# Patient Record
Sex: FEMALE | Race: WHITE | Employment: OTHER | URBAN - METROPOLITAN AREA
[De-identification: names, ages, dates, MRNs, and addresses within clinical notes are randomized per-mention and may not be internally consistent; named-entity substitution may affect disease eponyms.]

---

## 2017-06-27 ENCOUNTER — OFFICE VISIT (OUTPATIENT)
Dept: ENT CLINIC | Age: 77
End: 2017-06-27

## 2017-06-27 VITALS
HEART RATE: 71 BPM | SYSTOLIC BLOOD PRESSURE: 127 MMHG | HEIGHT: 63 IN | DIASTOLIC BLOOD PRESSURE: 77 MMHG | BODY MASS INDEX: 25.87 KG/M2 | WEIGHT: 146 LBS

## 2017-06-27 DIAGNOSIS — C30.0: Primary | ICD-10-CM

## 2017-06-27 DIAGNOSIS — H61.23 BILATERAL IMPACTED CERUMEN: ICD-10-CM

## 2017-06-27 PROCEDURE — G8399 PT W/DXA RESULTS DOCUMENT: HCPCS | Performed by: OTOLARYNGOLOGY

## 2017-06-27 PROCEDURE — 99213 OFFICE O/P EST LOW 20 MIN: CPT | Performed by: OTOLARYNGOLOGY

## 2017-06-27 PROCEDURE — 1123F ACP DISCUSS/DSCN MKR DOCD: CPT | Performed by: OTOLARYNGOLOGY

## 2017-06-27 PROCEDURE — 1090F PRES/ABSN URINE INCON ASSESS: CPT | Performed by: OTOLARYNGOLOGY

## 2017-06-27 PROCEDURE — 69210 REMOVE IMPACTED EAR WAX UNI: CPT | Performed by: OTOLARYNGOLOGY

## 2017-06-27 PROCEDURE — 1036F TOBACCO NON-USER: CPT | Performed by: OTOLARYNGOLOGY

## 2017-06-27 PROCEDURE — 4040F PNEUMOC VAC/ADMIN/RCVD: CPT | Performed by: OTOLARYNGOLOGY

## 2017-06-27 PROCEDURE — G8419 CALC BMI OUT NRM PARAM NOF/U: HCPCS | Performed by: OTOLARYNGOLOGY

## 2017-06-27 PROCEDURE — G8427 DOCREV CUR MEDS BY ELIG CLIN: HCPCS | Performed by: OTOLARYNGOLOGY

## 2018-03-26 ENCOUNTER — OFFICE VISIT (OUTPATIENT)
Dept: ENT CLINIC | Age: 78
End: 2018-03-26

## 2018-03-26 VITALS — SYSTOLIC BLOOD PRESSURE: 118 MMHG | DIASTOLIC BLOOD PRESSURE: 80 MMHG

## 2018-03-26 DIAGNOSIS — C30.0: Primary | ICD-10-CM

## 2018-03-26 PROCEDURE — 31231 NASAL ENDOSCOPY DX: CPT | Performed by: OTOLARYNGOLOGY

## 2018-03-26 PROCEDURE — 4040F PNEUMOC VAC/ADMIN/RCVD: CPT | Performed by: OTOLARYNGOLOGY

## 2018-03-26 PROCEDURE — 1123F ACP DISCUSS/DSCN MKR DOCD: CPT | Performed by: OTOLARYNGOLOGY

## 2018-03-26 PROCEDURE — G8427 DOCREV CUR MEDS BY ELIG CLIN: HCPCS | Performed by: OTOLARYNGOLOGY

## 2018-03-26 PROCEDURE — G8421 BMI NOT CALCULATED: HCPCS | Performed by: OTOLARYNGOLOGY

## 2018-03-26 PROCEDURE — 1090F PRES/ABSN URINE INCON ASSESS: CPT | Performed by: OTOLARYNGOLOGY

## 2018-03-26 PROCEDURE — G8399 PT W/DXA RESULTS DOCUMENT: HCPCS | Performed by: OTOLARYNGOLOGY

## 2018-03-26 PROCEDURE — G8484 FLU IMMUNIZE NO ADMIN: HCPCS | Performed by: OTOLARYNGOLOGY

## 2018-03-26 PROCEDURE — 1036F TOBACCO NON-USER: CPT | Performed by: OTOLARYNGOLOGY

## 2018-04-02 ENCOUNTER — HOSPITAL ENCOUNTER (OUTPATIENT)
Dept: CT IMAGING | Age: 78
Discharge: HOME | End: 2018-04-02
Attending: OTOLARYNGOLOGY | Admitting: OTOLARYNGOLOGY

## 2018-04-02 DIAGNOSIS — C30.0: ICD-10-CM

## 2018-04-02 DIAGNOSIS — C30.0 MALIGNANT NEOPLASM OF NASAL CAVITY (HCC): ICD-10-CM

## 2019-05-16 ENCOUNTER — OFFICE VISIT (OUTPATIENT)
Dept: ENT CLINIC | Age: 79
End: 2019-05-16
Payer: MEDICARE

## 2019-05-16 VITALS — HEART RATE: 64 BPM | SYSTOLIC BLOOD PRESSURE: 118 MMHG | OXYGEN SATURATION: 98 % | DIASTOLIC BLOOD PRESSURE: 60 MMHG

## 2019-05-16 DIAGNOSIS — N28.9 KIDNEY DISEASE: ICD-10-CM

## 2019-05-16 DIAGNOSIS — G44.001 INTRACTABLE CLUSTER HEADACHE SYNDROME, UNSPECIFIED CHRONICITY PATTERN: ICD-10-CM

## 2019-05-16 DIAGNOSIS — C30.0: Primary | ICD-10-CM

## 2019-05-16 PROCEDURE — 1123F ACP DISCUSS/DSCN MKR DOCD: CPT | Performed by: OTOLARYNGOLOGY

## 2019-05-16 PROCEDURE — 1036F TOBACCO NON-USER: CPT | Performed by: OTOLARYNGOLOGY

## 2019-05-16 PROCEDURE — 99213 OFFICE O/P EST LOW 20 MIN: CPT | Performed by: OTOLARYNGOLOGY

## 2019-05-16 PROCEDURE — G8421 BMI NOT CALCULATED: HCPCS | Performed by: OTOLARYNGOLOGY

## 2019-05-16 PROCEDURE — G8399 PT W/DXA RESULTS DOCUMENT: HCPCS | Performed by: OTOLARYNGOLOGY

## 2019-05-16 PROCEDURE — 4040F PNEUMOC VAC/ADMIN/RCVD: CPT | Performed by: OTOLARYNGOLOGY

## 2019-05-16 PROCEDURE — 1090F PRES/ABSN URINE INCON ASSESS: CPT | Performed by: OTOLARYNGOLOGY

## 2019-05-16 PROCEDURE — G8427 DOCREV CUR MEDS BY ELIG CLIN: HCPCS | Performed by: OTOLARYNGOLOGY

## 2019-05-16 RX ORDER — PREDNISONE 10 MG/1
TABLET ORAL
Qty: 25 TABLET | Refills: 0 | Status: SHIPPED | OUTPATIENT
Start: 2019-05-16 | End: 2019-06-04 | Stop reason: SDUPTHER

## 2019-05-16 NOTE — PROGRESS NOTES
Patient relates that a couple weeks ago she had an apparent sinus infection saw her PCP who treated her with amoxicillin as well as the nose spray and oral medication. He did have a CT scan done which did not show evidence of recurrence of her nasal malignancy but did show some evidence of sinus infection. She apparently felt somewhat better but is now still noticing severe pain in the right frontal area which appears to be persistent. There is no further congestion and drainage through her nose with bleeding as she had in the initial phase. More recently, she's been experiencing diplopia which is making it impossible for her to drive. She did see an ophthalmologist who tried laser therapy which apparently has not been particularly successful. No particular change in vision otherwise has been noted. There's been no fever. She does not smoke. Currently, she does not appear to be in any acute distress. Ear examination reveals normal findings with normal-appearing tympanic membranes and ear canals. Oral examination is unremarkable. The neck is free of any adenopathy, mass, thyroid enlargement. There is no obvious tenderness overlying the frontal sinuses on either side. I have evaluated her extraocular movements which seemed to be intact. No pupils are equal round regular and reactive to light and accommodation. Nasal mucosa treated with cotton pledgets  impregnated with Afrin and lidocaine placed in middle meatuses against turbinates. Pledgets left in place for five minutes and removed enabling enhanced visualization. The exam revealed positive edema of mucosa. it was negative for erythema indicative of infection. There was not evidence of deviation of the septum which was not significant. There were not polyps present. .There were not other masses present. The turbinates were not enlarged beyond normal.  I cannot detect any evidence of recurrent malignancy. No obvious purulent drainage is noted.   I suspect that some of this may be more of an allergic phenomenon with inadequate drainage from the sinuses. I suggested that she continue saline irrigation and she is long-standing done. I do feel the dryness is part of the problem which relates back to her radiation therapy. In addition, we'll obtain an MRI scan. I will place her on prednisone. I have reviewed all of the findings including a recent CT scan.

## 2019-06-04 DIAGNOSIS — C30.0: ICD-10-CM

## 2019-06-04 DIAGNOSIS — G44.001 INTRACTABLE CLUSTER HEADACHE SYNDROME, UNSPECIFIED CHRONICITY PATTERN: ICD-10-CM

## 2019-06-04 RX ORDER — PREDNISONE 10 MG/1
TABLET ORAL
Qty: 25 TABLET | Refills: 0 | Status: SHIPPED | OUTPATIENT
Start: 2019-06-04 | End: 2019-12-05

## 2019-06-19 ENCOUNTER — TELEPHONE (OUTPATIENT)
Dept: ENT CLINIC | Age: 79
End: 2019-06-19

## 2019-06-19 DIAGNOSIS — G44.001 INTRACTABLE CLUSTER HEADACHE SYNDROME, UNSPECIFIED CHRONICITY PATTERN: ICD-10-CM

## 2019-06-19 DIAGNOSIS — C30.0: Primary | ICD-10-CM

## 2019-06-27 ENCOUNTER — HOSPITAL ENCOUNTER (OUTPATIENT)
Dept: MRI IMAGING | Age: 79
Discharge: HOME | End: 2019-06-27
Payer: MEDICARE

## 2019-06-27 DIAGNOSIS — G44.001 INTRACTABLE CLUSTER HEADACHE SYNDROME, UNSPECIFIED CHRONICITY PATTERN: ICD-10-CM

## 2019-06-27 DIAGNOSIS — C30.0: ICD-10-CM

## 2019-06-27 LAB
ALBUMIN SERPL-MCNC: 4.2 G/DL (ref 3.4–5)
ANION GAP SERPL CALCULATED.3IONS-SCNC: 9 MMOL/L (ref 3–16)
BUN BLDV-MCNC: 16 MG/DL (ref 7–20)
CALCIUM SERPL-MCNC: 9.4 MG/DL (ref 8.3–10.6)
CHLORIDE BLD-SCNC: 106 MMOL/L (ref 99–110)
CO2: 27 MMOL/L (ref 21–32)
CREAT SERPL-MCNC: 1.2 MG/DL (ref 0.6–1.2)
GFR AFRICAN AMERICAN: 53
GFR NON-AFRICAN AMERICAN: 43
GLUCOSE BLD-MCNC: 93 MG/DL (ref 70–99)
PHOSPHORUS: 3.4 MG/DL (ref 2.5–4.9)
POTASSIUM SERPL-SCNC: 4.4 MMOL/L (ref 3.5–5.1)
SODIUM BLD-SCNC: 142 MMOL/L (ref 136–145)

## 2019-06-27 PROCEDURE — 80069 RENAL FUNCTION PANEL: CPT

## 2019-06-27 PROCEDURE — 2580000003 HC RX 258: Performed by: OTOLARYNGOLOGY

## 2019-06-27 PROCEDURE — 6360000004 HC RX CONTRAST MEDICATION: Performed by: OTOLARYNGOLOGY

## 2019-06-27 PROCEDURE — A9579 GAD-BASE MR CONTRAST NOS,1ML: HCPCS | Performed by: OTOLARYNGOLOGY

## 2019-06-27 PROCEDURE — 36415 COLL VENOUS BLD VENIPUNCTURE: CPT

## 2019-06-27 PROCEDURE — 70553 MRI BRAIN STEM W/O & W/DYE: CPT

## 2019-06-27 RX ORDER — SODIUM CHLORIDE 0.9 % (FLUSH) 0.9 %
10 SYRINGE (ML) INJECTION ONCE
Status: COMPLETED | OUTPATIENT
Start: 2019-06-27 | End: 2019-06-27

## 2019-06-27 RX ADMIN — GADOTERIDOL 13 ML: 279.3 INJECTION, SOLUTION INTRAVENOUS at 15:30

## 2019-06-27 RX ADMIN — Medication 10 ML: at 15:31

## 2019-12-02 ENCOUNTER — TELEPHONE (OUTPATIENT)
Dept: ENT CLINIC | Age: 79
End: 2019-12-02

## 2019-12-02 DIAGNOSIS — J02.9 ACUTE PHARYNGITIS, UNSPECIFIED ETIOLOGY: Primary | ICD-10-CM

## 2019-12-02 RX ORDER — AZITHROMYCIN 250 MG/1
TABLET, FILM COATED ORAL
Qty: 1 PACKET | Refills: 0 | Status: SHIPPED | OUTPATIENT
Start: 2019-12-02 | End: 2019-12-05

## 2019-12-05 ENCOUNTER — OFFICE VISIT (OUTPATIENT)
Dept: ENT CLINIC | Age: 79
End: 2019-12-05
Payer: MEDICARE

## 2019-12-05 VITALS — DIASTOLIC BLOOD PRESSURE: 66 MMHG | OXYGEN SATURATION: 94 % | HEART RATE: 81 BPM | SYSTOLIC BLOOD PRESSURE: 104 MMHG

## 2019-12-05 DIAGNOSIS — J04.10 TRACHEITIS: Primary | ICD-10-CM

## 2019-12-05 PROCEDURE — 96372 THER/PROPH/DIAG INJ SC/IM: CPT | Performed by: OTOLARYNGOLOGY

## 2019-12-05 PROCEDURE — 1036F TOBACCO NON-USER: CPT | Performed by: OTOLARYNGOLOGY

## 2019-12-05 PROCEDURE — G8421 BMI NOT CALCULATED: HCPCS | Performed by: OTOLARYNGOLOGY

## 2019-12-05 PROCEDURE — 1090F PRES/ABSN URINE INCON ASSESS: CPT | Performed by: OTOLARYNGOLOGY

## 2019-12-05 PROCEDURE — 99213 OFFICE O/P EST LOW 20 MIN: CPT | Performed by: OTOLARYNGOLOGY

## 2019-12-05 PROCEDURE — 4040F PNEUMOC VAC/ADMIN/RCVD: CPT | Performed by: OTOLARYNGOLOGY

## 2019-12-05 PROCEDURE — 1123F ACP DISCUSS/DSCN MKR DOCD: CPT | Performed by: OTOLARYNGOLOGY

## 2019-12-05 PROCEDURE — G8399 PT W/DXA RESULTS DOCUMENT: HCPCS | Performed by: OTOLARYNGOLOGY

## 2019-12-05 PROCEDURE — G8428 CUR MEDS NOT DOCUMENT: HCPCS | Performed by: OTOLARYNGOLOGY

## 2019-12-05 PROCEDURE — G8484 FLU IMMUNIZE NO ADMIN: HCPCS | Performed by: OTOLARYNGOLOGY

## 2019-12-05 RX ORDER — METHYLPREDNISOLONE ACETATE 40 MG/ML
40 INJECTION, SUSPENSION INTRA-ARTICULAR; INTRALESIONAL; INTRAMUSCULAR; SOFT TISSUE ONCE
Status: COMPLETED | OUTPATIENT
Start: 2019-12-05 | End: 2019-12-05

## 2019-12-05 RX ORDER — CLINDAMYCIN HYDROCHLORIDE 300 MG/1
300 CAPSULE ORAL 3 TIMES DAILY
Qty: 21 CAPSULE | Refills: 0 | Status: SHIPPED | OUTPATIENT
Start: 2019-12-05 | End: 2019-12-12

## 2019-12-05 RX ORDER — METHYLPREDNISOLONE 4 MG/1
4 TABLET ORAL SEE ADMIN INSTRUCTIONS
Qty: 1 KIT | Refills: 0 | Status: SHIPPED | OUTPATIENT
Start: 2019-12-05 | End: 2021-03-09

## 2019-12-05 RX ADMIN — METHYLPREDNISOLONE ACETATE 40 MG: 40 INJECTION, SUSPENSION INTRA-ARTICULAR; INTRALESIONAL; INTRAMUSCULAR; SOFT TISSUE at 10:48

## 2021-03-09 ENCOUNTER — OFFICE VISIT (OUTPATIENT)
Dept: ENT CLINIC | Age: 81
End: 2021-03-09
Payer: MEDICARE

## 2021-03-09 VITALS — SYSTOLIC BLOOD PRESSURE: 123 MMHG | TEMPERATURE: 97.1 F | HEART RATE: 73 BPM | DIASTOLIC BLOOD PRESSURE: 79 MMHG

## 2021-03-09 DIAGNOSIS — Z85.22 HISTORY OF CANCER OF NASAL CAVITY: Primary | ICD-10-CM

## 2021-03-09 PROCEDURE — G8427 DOCREV CUR MEDS BY ELIG CLIN: HCPCS | Performed by: OTOLARYNGOLOGY

## 2021-03-09 PROCEDURE — G8399 PT W/DXA RESULTS DOCUMENT: HCPCS | Performed by: OTOLARYNGOLOGY

## 2021-03-09 PROCEDURE — 99213 OFFICE O/P EST LOW 20 MIN: CPT | Performed by: OTOLARYNGOLOGY

## 2021-03-09 PROCEDURE — 1036F TOBACCO NON-USER: CPT | Performed by: OTOLARYNGOLOGY

## 2021-03-09 PROCEDURE — G8421 BMI NOT CALCULATED: HCPCS | Performed by: OTOLARYNGOLOGY

## 2021-03-09 PROCEDURE — G8484 FLU IMMUNIZE NO ADMIN: HCPCS | Performed by: OTOLARYNGOLOGY

## 2021-03-09 PROCEDURE — 4040F PNEUMOC VAC/ADMIN/RCVD: CPT | Performed by: OTOLARYNGOLOGY

## 2021-03-09 PROCEDURE — 1090F PRES/ABSN URINE INCON ASSESS: CPT | Performed by: OTOLARYNGOLOGY

## 2021-03-09 PROCEDURE — 1123F ACP DISCUSS/DSCN MKR DOCD: CPT | Performed by: OTOLARYNGOLOGY

## 2021-03-09 NOTE — PROGRESS NOTES
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 He has had no particular symptoms other than dryness which is common for her. No bleeding is been noted and no difficulty in breathing has been apparent. She has had no fever. Currently, she appears in no acute distress. Ear examination does reveal cerumen removed with curettes. The tympanic membranes appear normal and the remainder of the ear examination is unremarkable. Oral examination is unremarkable. The neck is free of adenopathy, mass, thyroid enlargement. Nasal mucosa treated with cotton pledgets  impregnated with Afrin and lidocaine placed in middle meatuses against turbinates. Pledgets left in place for five minutes and removed enabling enhanced visualization. The exam revealed positive edema of mucosa. it was negative for erythema indicative of infection. There was not evidence of deviation of the septum which was not significant. There were not polyps present. .There were not other masses present. The turbinates were not enlarged beyond normal.  Other than dryness of the mucous membranes, the nose appears entirely normal with no evidence of recurrent disease. No infection is apparent. She will return in 6 to 12 months.

## 2021-04-09 ENCOUNTER — OFFICE VISIT (OUTPATIENT)
Dept: ENT CLINIC | Age: 81
End: 2021-04-09
Payer: MEDICARE

## 2021-04-09 VITALS
WEIGHT: 146.2 LBS | BODY MASS INDEX: 25.9 KG/M2 | SYSTOLIC BLOOD PRESSURE: 125 MMHG | TEMPERATURE: 97.2 F | DIASTOLIC BLOOD PRESSURE: 81 MMHG | HEART RATE: 77 BPM

## 2021-04-09 DIAGNOSIS — K21.9 LARYNGOPHARYNGEAL REFLUX DISEASE: ICD-10-CM

## 2021-04-09 DIAGNOSIS — J30.9 ALLERGIC SINUSITIS: ICD-10-CM

## 2021-04-09 DIAGNOSIS — J32.9 RECURRENT SINUSITIS: Primary | ICD-10-CM

## 2021-04-09 PROCEDURE — G8399 PT W/DXA RESULTS DOCUMENT: HCPCS | Performed by: OTOLARYNGOLOGY

## 2021-04-09 PROCEDURE — 1123F ACP DISCUSS/DSCN MKR DOCD: CPT | Performed by: OTOLARYNGOLOGY

## 2021-04-09 PROCEDURE — 1090F PRES/ABSN URINE INCON ASSESS: CPT | Performed by: OTOLARYNGOLOGY

## 2021-04-09 PROCEDURE — 4040F PNEUMOC VAC/ADMIN/RCVD: CPT | Performed by: OTOLARYNGOLOGY

## 2021-04-09 PROCEDURE — G8419 CALC BMI OUT NRM PARAM NOF/U: HCPCS | Performed by: OTOLARYNGOLOGY

## 2021-04-09 PROCEDURE — 99213 OFFICE O/P EST LOW 20 MIN: CPT | Performed by: OTOLARYNGOLOGY

## 2021-04-09 PROCEDURE — G8427 DOCREV CUR MEDS BY ELIG CLIN: HCPCS | Performed by: OTOLARYNGOLOGY

## 2021-04-09 PROCEDURE — 1036F TOBACCO NON-USER: CPT | Performed by: OTOLARYNGOLOGY

## 2021-04-09 RX ORDER — GUAIFENESIN 600 MG/1
600 TABLET, EXTENDED RELEASE ORAL 2 TIMES DAILY
Qty: 30 TABLET | Refills: 0 | Status: SHIPPED | OUTPATIENT
Start: 2021-04-09 | End: 2021-04-24

## 2021-04-09 RX ORDER — DOXYCYCLINE HYCLATE 100 MG
100 TABLET ORAL 2 TIMES DAILY
Qty: 28 TABLET | Refills: 0 | Status: SHIPPED | OUTPATIENT
Start: 2021-04-09 | End: 2021-04-23

## 2021-04-09 RX ORDER — METHYLPREDNISOLONE 4 MG/1
4 TABLET ORAL SEE ADMIN INSTRUCTIONS
Qty: 1 KIT | Refills: 0 | Status: SHIPPED | OUTPATIENT
Start: 2021-04-09 | End: 2022-03-03 | Stop reason: ALTCHOICE

## 2021-04-09 NOTE — PROGRESS NOTES
The course the past 1 month, the patient has been noticing a marked increase in drainage down the back of her throat causing it to be quite sore. She has had no trouble swallowing and has had no particular voice change. She has had no fever. She tells me that she has been having diplopia for the past several months that began suddenly and has been evaluated and being evaluated with the [address] without resolution at this point. She is not driving. She has had no significant nasal symptoms directly. Therefore, I do not feel that this is related to her previous problem. Currently, she is in no obvious acute distress. Ear examination reveals normal-appearing tympanic membranes and ear canals bilaterally. Oral examination is unremarkable. Indirect laryngoscopy reveals a normal-appearing epiglottis. The cords are well visualized and are mobile and have no lesions. No abnormalities are significantly present. She does have a history of reflux and does take Prilosec on a daily basis. Nasal mucosa treated with cotton pledgets  impregnated with Afrin and lidocaine placed in middle meatuses against turbinates. Pledgets left in place for five minutes and removed enabling enhanced visualization. The exam revealed positive edema of mucosa. it was positive for erythema indicative of infection. There was not evidence of deviation of the septum which was not significant. There were not polyps present. .There were not other masses present. The turbinates were not enlarged beyond normal.  Extraocular movements are intact and full. Pupils are equal round regular and reactive to light and accommodation. Findings seem mostly those of sinus drainage likely on an allergic as well as some mild infectious basis. There is no evidence of recurrent disease in her nose as far as the malignancy. The reflux may also be playing a role and therefore she should continue the Prilosec.   We will add a course of doxycycline as well as a Medrol Dosepak and Mucinex. She will contact me in 1 week to determine her response.

## 2021-05-19 ENCOUNTER — OFFICE VISIT (OUTPATIENT)
Dept: ENT CLINIC | Age: 81
End: 2021-05-19
Payer: MEDICARE

## 2021-05-19 VITALS — HEART RATE: 72 BPM | DIASTOLIC BLOOD PRESSURE: 77 MMHG | TEMPERATURE: 97.1 F | SYSTOLIC BLOOD PRESSURE: 110 MMHG

## 2021-05-19 DIAGNOSIS — R13.19 ESOPHAGEAL DYSPHAGIA: Primary | ICD-10-CM

## 2021-05-19 PROCEDURE — G8419 CALC BMI OUT NRM PARAM NOF/U: HCPCS | Performed by: OTOLARYNGOLOGY

## 2021-05-19 PROCEDURE — 99213 OFFICE O/P EST LOW 20 MIN: CPT | Performed by: OTOLARYNGOLOGY

## 2021-05-19 PROCEDURE — 1036F TOBACCO NON-USER: CPT | Performed by: OTOLARYNGOLOGY

## 2021-05-19 PROCEDURE — 1090F PRES/ABSN URINE INCON ASSESS: CPT | Performed by: OTOLARYNGOLOGY

## 2021-05-19 PROCEDURE — G8399 PT W/DXA RESULTS DOCUMENT: HCPCS | Performed by: OTOLARYNGOLOGY

## 2021-05-19 PROCEDURE — 4040F PNEUMOC VAC/ADMIN/RCVD: CPT | Performed by: OTOLARYNGOLOGY

## 2021-05-19 PROCEDURE — 1123F ACP DISCUSS/DSCN MKR DOCD: CPT | Performed by: OTOLARYNGOLOGY

## 2021-05-19 PROCEDURE — G8427 DOCREV CUR MEDS BY ELIG CLIN: HCPCS | Performed by: OTOLARYNGOLOGY

## 2021-05-19 RX ORDER — CLOTRIMAZOLE 10 MG/1
10 LOZENGE ORAL; TOPICAL 3 TIMES DAILY
Qty: 30 TABLET | Refills: 0 | Status: SHIPPED | OUTPATIENT
Start: 2021-05-19 | End: 2021-05-29

## 2021-05-19 RX ORDER — FAMOTIDINE 40 MG/1
40 TABLET, FILM COATED ORAL EVERY EVENING
Qty: 30 TABLET | Refills: 0 | Status: SHIPPED | OUTPATIENT
Start: 2021-05-19 | End: 2021-06-11

## 2021-06-11 DIAGNOSIS — R13.19 ESOPHAGEAL DYSPHAGIA: ICD-10-CM

## 2021-06-11 RX ORDER — FAMOTIDINE 40 MG/1
40 TABLET, FILM COATED ORAL EVERY EVENING
Qty: 30 TABLET | Refills: 0 | Status: SHIPPED | OUTPATIENT
Start: 2021-06-11 | End: 2021-07-21

## 2021-06-12 ENCOUNTER — HOSPITAL ENCOUNTER (OUTPATIENT)
Dept: GENERAL RADIOLOGY | Age: 81
Discharge: HOME | End: 2021-06-12
Payer: MEDICARE

## 2021-06-12 DIAGNOSIS — R13.10 DYSPHAGIA, UNSPECIFIED TYPE: ICD-10-CM

## 2021-06-12 PROCEDURE — 74220 X-RAY XM ESOPHAGUS 1CNTRST: CPT

## 2021-06-14 ENCOUNTER — TELEPHONE (OUTPATIENT)
Dept: OTOLARYNGOLOGY | Age: 81
End: 2021-06-14

## 2021-06-30 ENCOUNTER — TELEPHONE (OUTPATIENT)
Dept: OTOLARYNGOLOGY | Age: 81
End: 2021-06-30

## 2021-06-30 NOTE — TELEPHONE ENCOUNTER
I have spoken with the patient in the regards that the barium swallow was unremarkable but that she does have some problem actually swallowing the pill. There is no evidence on this examination of any tumor. Previous examinations have not indicated any tumor. 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 She is now related that she is having some blood in her sputum which is not generally felt to be due to a cough. She has been seen and evaluated for double vision and recent MRI scan has been done. When that is evaluated, she may well need further evaluation in her throat with perhaps a fiberoptic scoping for the upper digestive tract and perhaps an evaluation by a gastroenterologist for the lower. I originally thought that she might need a modified barium swallow for speech pathology to do dysphagia care. However, further evaluation may be necessary prior to that. This may also include the CT scan of the neck. In any event, she will be contacting the office following her MRI scan.

## 2021-07-16 DIAGNOSIS — R13.19 ESOPHAGEAL DYSPHAGIA: ICD-10-CM

## 2021-07-21 RX ORDER — FAMOTIDINE 40 MG/1
40 TABLET, FILM COATED ORAL EVERY EVENING
Qty: 30 TABLET | Refills: 0 | Status: SHIPPED | OUTPATIENT
Start: 2021-07-21 | End: 2022-03-03 | Stop reason: ALTCHOICE

## 2021-08-15 DIAGNOSIS — R13.19 ESOPHAGEAL DYSPHAGIA: ICD-10-CM

## 2021-08-16 RX ORDER — FAMOTIDINE 40 MG/1
TABLET, FILM COATED ORAL
Qty: 30 TABLET | Refills: 0 | OUTPATIENT
Start: 2021-08-16

## 2022-02-14 ENCOUNTER — OFFICE VISIT (OUTPATIENT)
Dept: ENT CLINIC | Age: 82
End: 2022-02-14
Payer: MEDICARE

## 2022-02-14 VITALS — TEMPERATURE: 97.1 F | HEART RATE: 76 BPM | SYSTOLIC BLOOD PRESSURE: 132 MMHG | DIASTOLIC BLOOD PRESSURE: 83 MMHG

## 2022-02-14 DIAGNOSIS — Z85.22 HISTORY OF CANCER OF NASAL CAVITY: Primary | ICD-10-CM

## 2022-02-14 DIAGNOSIS — J34.89 NASAL SEPTAL PERFORATION: ICD-10-CM

## 2022-02-14 DIAGNOSIS — M26.621 ARTHRALGIA OF RIGHT TEMPOROMANDIBULAR JOINT: ICD-10-CM

## 2022-02-14 PROCEDURE — G8419 CALC BMI OUT NRM PARAM NOF/U: HCPCS | Performed by: STUDENT IN AN ORGANIZED HEALTH CARE EDUCATION/TRAINING PROGRAM

## 2022-02-14 PROCEDURE — G8427 DOCREV CUR MEDS BY ELIG CLIN: HCPCS | Performed by: STUDENT IN AN ORGANIZED HEALTH CARE EDUCATION/TRAINING PROGRAM

## 2022-02-14 PROCEDURE — 31231 NASAL ENDOSCOPY DX: CPT | Performed by: STUDENT IN AN ORGANIZED HEALTH CARE EDUCATION/TRAINING PROGRAM

## 2022-02-14 PROCEDURE — 1036F TOBACCO NON-USER: CPT | Performed by: STUDENT IN AN ORGANIZED HEALTH CARE EDUCATION/TRAINING PROGRAM

## 2022-02-14 PROCEDURE — G8399 PT W/DXA RESULTS DOCUMENT: HCPCS | Performed by: STUDENT IN AN ORGANIZED HEALTH CARE EDUCATION/TRAINING PROGRAM

## 2022-02-14 PROCEDURE — 1123F ACP DISCUSS/DSCN MKR DOCD: CPT | Performed by: STUDENT IN AN ORGANIZED HEALTH CARE EDUCATION/TRAINING PROGRAM

## 2022-02-14 PROCEDURE — 99213 OFFICE O/P EST LOW 20 MIN: CPT | Performed by: STUDENT IN AN ORGANIZED HEALTH CARE EDUCATION/TRAINING PROGRAM

## 2022-02-14 PROCEDURE — 1090F PRES/ABSN URINE INCON ASSESS: CPT | Performed by: STUDENT IN AN ORGANIZED HEALTH CARE EDUCATION/TRAINING PROGRAM

## 2022-02-14 PROCEDURE — G8484 FLU IMMUNIZE NO ADMIN: HCPCS | Performed by: STUDENT IN AN ORGANIZED HEALTH CARE EDUCATION/TRAINING PROGRAM

## 2022-02-14 PROCEDURE — 4040F PNEUMOC VAC/ADMIN/RCVD: CPT | Performed by: STUDENT IN AN ORGANIZED HEALTH CARE EDUCATION/TRAINING PROGRAM

## 2022-02-14 NOTE — PROGRESS NOTES
[address] VISIT      Patient Name: Kade Jackson Record Number:  [id number]  Primary Care Physician:  James Claire    ChiefComplaint     Chief Complaint   Patient presents with    Other     patient states she feels she has a sinus infection, sinus pressure and congestion. History of Present Illness     Kathleen Conner is an 81 y.o. 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 Last surgery was at [address] [place] [address] in [facility] approx 10 years ago     Interval History:   When she called to make the appointment approximately 6 weeks ago she was having mucopurulent drainage and a sinus infection. She was taking Tylenol as needed. Currently her sinus infection has resolved. She needs to establish with ENT now that Dr. Carmenza Dyer has retired. Denies epistaxis. Has been having some right-sided jaw pain, worse when opening and closing her jaw.     Past Medical History     Past Medical History:   Diagnosis Date    Cancer ([address]     sinuses and cheek right    Hyperlipidemia     Hypothyroidism     Nausea & vomiting        Past Surgical History     Past Surgical History:   Procedure Laterality Date    CHOLECYSTECTOMY      HYSTERECTOMY      OTHER SURGICAL HISTORY      chemo/radiation for sinus cancers    SINUS SURGERY      UPPER GASTROINTESTINAL ENDOSCOPY  7-17-13    with biopsy, dilatation, and endoscopic ultrasound       Family History     Family History   Problem Relation Age of Onset    Cancer Mother     Cancer Father     Cancer Brother        Social History     Social History     Tobacco Use    Smoking status: Never Smoker    Smokeless tobacco: Never Used   Substance Use Topics    Alcohol use: No     Comment: rare    Drug use: Not on file        Allergies     Allergies   Allergen Reactions    Naprosyn [Naproxen]     Macrobid [Nitrofurantoin Monohydrate Macrocrystals] Rash       Medications     Current Outpatient Medications   Medication Sig Dispense Refill    famotidine (PEPCID) 40 MG tablet TAKE 1 TABLET BY MOUTH EVERY EVENING 30 tablet 0    methylPREDNISolone (MEDROL, SAROJ,) 4 MG tablet Take 1 tablet by mouth See Admin Instructions Take by mouth. 1 kit 0    pravastatin (PRAVACHOL) 40 MG tablet TAKE 1 TABLET BY MOUTH NIGHTLY. 90 tablet 0    levothyroxine (LEVOTHROID) 75 MCG tablet Take 1 tablet by mouth daily 90 tablet 3    azelastine (ASTELIN) 0.1 % nasal spray 2 sprays by Nasal route 2 times daily Use in each nostril as directed 1 Bottle 3    fluticasone (FLONASE) 50 MCG/ACT nasal spray 2 sprays by Nasal route daily 1 Bottle 1    Biotin 1000 MCG TABS Take  by mouth.  therapeutic multivitamin-minerals (THERAGRAN-M) tablet Take 1 tablet by mouth daily.  calcium carbonate (OSCAL) 500 MG TABS tablet Take 500 mg by mouth daily.  aspirin 81 MG EC tablet Take 81 mg by mouth daily. No current facility-administered medications for this visit.        Review of Systems     REVIEW OF SYSTEMS  The following systems were reviewed and revealed the following in addition to any already discussed in the HPI:    CONSTITUTIONAL: no weight loss, no fever, no night sweats, no chills  EYES: no vision changes, no blurry vision  EARS: no hearing loss, no otalgia  NOSE: no epistaxis, no rhinorrhea  THROAT: No voice changes, no sore throat, no dysphagia    PhysicalExam     Vitals:    02/14/22 1454   BP: 132/83   Site: Left Upper Arm   Position: Sitting   Cuff Size: Medium Adult   Pulse: 76   Temp: 97.1 °F (36.2 °C)   TempSrc: Temporal       PHYSICAL EXAM  /83 (Site: Left Upper Arm, Position: Sitting, Cuff Size: Medium Adult)   Pulse 76   Temp 97.1 °F (36.2 °C) (Temporal)     GENERAL: No acute distress, alert and oriented, no hoarseness  EYES: EOMI, Anti-icteric  NOSE: On anterior rhinoscopy there is no epistaxis, nasal mucosa moist and normal appearing, no purulent drainage. EARS: Normal external appearance; on portable otomicroscopy:     -Ad: External auditory canal without stenosis, tympanic membrane clear, no middle ear effusions or retractions     -As: External auditory canal without stenosis, tympanic membrane clear, no middle ear effusions or retractions  FACE: HB 1/6 bilaterally, symmetric appearing, sensation equal bilaterally  ORAL CAVITY: No masses or lesions visualized or palpated, uvula is midline, moist mucous membranes  NECK: Tenderness palpation of right TMJ upon opening closing jaw. Normal range of motion, no thyromegaly, trachea is midline, no palpable lymphadenopathy or neck masses, no crepitus  CHEST: Normal respiratory effort, breathing comfortably, no retractions  SKIN: No rashes, normal appearing skin, no evidence of skin lesions/tumors  NEURO: Cranial Nerves 2, 3, 4, 5, 6, 7, 11, 12 intact bilaterally     I have performed a head and neck physical exam personally or was physically present during the key or critical portions of the service. Procedure     Nasal Endoscopy, Diagnostic ([address])    Pre-op: History of cancer of nasal cavity  Post-op: History of cancer of nasal cavity, nasal septal perforation  Procedure: Nasal endoscopy    After obtaining verbal consent from the patient 2% lidocaine with afrin was sprayed into the nasal cavities. After allowing a time for anesthesia, a 0- and 30-degree rigid nasal endoscope was used to examine the nasal septum, turbinates, and mucosal tissue. The middle meatus and sphenoethmoid recess was examined bilaterally. There were no complications. Pertinent findings include: No evidence of mass lesion within bilateral sinonasal cavities. Evidence of prior endoscopic sinus surgery with some crusting in the bilateral nasal passages that was debrided with #9 [place] suction.   There is a posterior small approximately pea-sized septal perforation. No mucopurulent drainage. *The patient tolerated the procedure well without complication. I attest that I was present for and did the entire procedure myself. Assessment and Plan     1. History of cancer of nasal cavity  -No evidence of disease on endoscopy  -Start sinonasal saline irrigations    2. Arthralgia of right temporomandibular joint  - NSAIDs as needed for inflammation and pain  - Conservative measure discussed including no gum chewing, eating a softer diet, cutting bigger and tougher bites into smaller pieces, warm compresses to the affected side  - Consider use of mouthguard to prevent nocturnal bruxism. A dentist may be able to provide the patient with a custom-made mouthguard or they can be obtained over-the-counter. 3. Nasal septal perforation  -Asymptomatic, likely iatrogenic from prior surgery. Follow-Up     Return if symptoms worsen or fail to improve. Dr. Gordon Alcantara [place] [address]  Department of Otolaryngology/Head and Neck Surgery  2/18/22    Medical Decision Making: The following items were considered in medical decision making:  Independent review of images  Review / order clinical lab tests  Review / order radiology tests  Decision to obtain old records      This note was generated completely or in part utilizing Dragon dictation speech recognition software. Occasionally, words are mistranscribed and despite editing, the text may contain inaccuracies due to incorrect word recognition. If further clarification is needed please contact the office at [phone].

## 2022-03-03 ENCOUNTER — OFFICE VISIT (OUTPATIENT)
Dept: ENT CLINIC | Age: 82
End: 2022-03-03
Payer: MEDICARE

## 2022-03-03 ENCOUNTER — TELEPHONE (OUTPATIENT)
Dept: ENT CLINIC | Age: 82
End: 2022-03-03

## 2022-03-03 ENCOUNTER — HOSPITAL ENCOUNTER (INPATIENT)
Age: 82
LOS: 2 days | Discharge: HOME | DRG: 151 | End: 2022-03-05
Attending: EMERGENCY MEDICINE | Admitting: INTERNAL MEDICINE
Payer: MEDICARE

## 2022-03-03 ENCOUNTER — APPOINTMENT (OUTPATIENT)
Dept: CT IMAGING | Age: 82
DRG: 151 | End: 2022-03-03
Payer: MEDICARE

## 2022-03-03 VITALS — HEART RATE: 62 BPM | TEMPERATURE: 97.1 F | DIASTOLIC BLOOD PRESSURE: 78 MMHG | SYSTOLIC BLOOD PRESSURE: 123 MMHG

## 2022-03-03 DIAGNOSIS — R04.0 ACUTE POSTERIOR EPISTAXIS: Primary | ICD-10-CM

## 2022-03-03 DIAGNOSIS — Z01.818 PRE-OP TESTING: Primary | ICD-10-CM

## 2022-03-03 DIAGNOSIS — Z85.22 HISTORY OF CANCER OF NASAL CAVITY: ICD-10-CM

## 2022-03-03 DIAGNOSIS — R04.0 EPISTAXIS: Primary | ICD-10-CM

## 2022-03-03 LAB
A/G RATIO: 1.9 (ref 1.1–2.2)
ALBUMIN SERPL-MCNC: 4.8 G/DL (ref 3.4–5)
ALP BLD-CCNC: 91 U/L (ref 40–129)
ALT SERPL-CCNC: 15 U/L (ref 10–40)
ANION GAP SERPL CALCULATED.3IONS-SCNC: 11 MMOL/L (ref 3–16)
APTT: 40.9 SEC (ref 26.2–38.6)
AST SERPL-CCNC: 26 U/L (ref 15–37)
BASOPHILS ABSOLUTE: 0.1 K/UL (ref 0–0.2)
BASOPHILS RELATIVE PERCENT: 0.8 %
BILIRUB SERPL-MCNC: 0.4 MG/DL (ref 0–1)
BUN BLDV-MCNC: 23 MG/DL (ref 7–20)
CALCIUM SERPL-MCNC: 9.6 MG/DL (ref 8.3–10.6)
CHLORIDE BLD-SCNC: 105 MMOL/L (ref 99–110)
CO2: 25 MMOL/L (ref 21–32)
CREAT SERPL-MCNC: 1 MG/DL (ref 0.6–1.2)
EOSINOPHILS ABSOLUTE: 0.2 K/UL (ref 0–0.6)
EOSINOPHILS RELATIVE PERCENT: 3.7 %
GFR AFRICAN AMERICAN: >60
GFR NON-AFRICAN AMERICAN: 53
GLUCOSE BLD-MCNC: 93 MG/DL (ref 70–99)
HCT VFR BLD CALC: 41.3 % (ref 36–48)
HEMOGLOBIN: 13.6 G/DL (ref 12–16)
INR BLD: 0.97 (ref 0.88–1.12)
LYMPHOCYTES ABSOLUTE: 1.5 K/UL (ref 1–5.1)
LYMPHOCYTES RELATIVE PERCENT: 22.3 %
MCH RBC QN AUTO: 29.6 PG (ref 26–34)
MCHC RBC AUTO-ENTMCNC: 33 G/DL (ref 31–36)
MCV RBC AUTO: 89.6 FL (ref 80–100)
MONOCYTES ABSOLUTE: 0.6 K/UL (ref 0–1.3)
MONOCYTES RELATIVE PERCENT: 9.4 %
NEUTROPHILS ABSOLUTE: 4.2 K/UL (ref 1.7–7.7)
NEUTROPHILS RELATIVE PERCENT: 63.8 %
PDW BLD-RTO: 14.1 % (ref 12.4–15.4)
PLATELET # BLD: 200 K/UL (ref 135–450)
PMV BLD AUTO: 8.7 FL (ref 5–10.5)
POTASSIUM REFLEX MAGNESIUM: 4.4 MMOL/L (ref 3.5–5.1)
PROTHROMBIN TIME: 11 SEC (ref 9.9–12.7)
RBC # BLD: 4.61 M/UL (ref 4–5.2)
SODIUM BLD-SCNC: 141 MMOL/L (ref 136–145)
TOTAL PROTEIN: 7.3 G/DL (ref 6.4–8.2)
WBC # BLD: 6.5 K/UL (ref 4–11)

## 2022-03-03 PROCEDURE — 1123F ACP DISCUSS/DSCN MKR DOCD: CPT | Performed by: STUDENT IN AN ORGANIZED HEALTH CARE EDUCATION/TRAINING PROGRAM

## 2022-03-03 PROCEDURE — 99214 OFFICE O/P EST MOD 30 MIN: CPT | Performed by: STUDENT IN AN ORGANIZED HEALTH CARE EDUCATION/TRAINING PROGRAM

## 2022-03-03 PROCEDURE — 6360000002 HC RX W HCPCS: Performed by: STUDENT IN AN ORGANIZED HEALTH CARE EDUCATION/TRAINING PROGRAM

## 2022-03-03 PROCEDURE — 85730 THROMBOPLASTIN TIME PARTIAL: CPT

## 2022-03-03 PROCEDURE — 85025 COMPLETE CBC W/AUTO DIFF WBC: CPT

## 2022-03-03 PROCEDURE — 1090F PRES/ABSN URINE INCON ASSESS: CPT | Performed by: STUDENT IN AN ORGANIZED HEALTH CARE EDUCATION/TRAINING PROGRAM

## 2022-03-03 PROCEDURE — 70486 CT MAXILLOFACIAL W/O DYE: CPT

## 2022-03-03 PROCEDURE — 85610 PROTHROMBIN TIME: CPT

## 2022-03-03 PROCEDURE — 2580000003 HC RX 258: Performed by: PHYSICIAN ASSISTANT

## 2022-03-03 PROCEDURE — 30905 CONTROL OF NOSEBLEED: CPT | Performed by: STUDENT IN AN ORGANIZED HEALTH CARE EDUCATION/TRAINING PROGRAM

## 2022-03-03 PROCEDURE — 94761 N-INVAS EAR/PLS OXIMETRY MLT: CPT

## 2022-03-03 PROCEDURE — 2580000003 HC RX 258: Performed by: INTERNAL MEDICINE

## 2022-03-03 PROCEDURE — 6370000000 HC RX 637 (ALT 250 FOR IP): Performed by: INTERNAL MEDICINE

## 2022-03-03 PROCEDURE — G8484 FLU IMMUNIZE NO ADMIN: HCPCS | Performed by: STUDENT IN AN ORGANIZED HEALTH CARE EDUCATION/TRAINING PROGRAM

## 2022-03-03 PROCEDURE — G8427 DOCREV CUR MEDS BY ELIG CLIN: HCPCS | Performed by: STUDENT IN AN ORGANIZED HEALTH CARE EDUCATION/TRAINING PROGRAM

## 2022-03-03 PROCEDURE — 093K8ZZ CONTROL BLEEDING IN NASAL MUCOSA AND SOFT TISSUE, VIA NATURAL OR ARTIFICIAL OPENING ENDOSCOPIC: ICD-10-PCS | Performed by: STUDENT IN AN ORGANIZED HEALTH CARE EDUCATION/TRAINING PROGRAM

## 2022-03-03 PROCEDURE — 36415 COLL VENOUS BLD VENIPUNCTURE: CPT

## 2022-03-03 PROCEDURE — 4040F PNEUMOC VAC/ADMIN/RCVD: CPT | Performed by: STUDENT IN AN ORGANIZED HEALTH CARE EDUCATION/TRAINING PROGRAM

## 2022-03-03 PROCEDURE — 80053 COMPREHEN METABOLIC PANEL: CPT

## 2022-03-03 PROCEDURE — G8420 CALC BMI NORM PARAMETERS: HCPCS | Performed by: STUDENT IN AN ORGANIZED HEALTH CARE EDUCATION/TRAINING PROGRAM

## 2022-03-03 PROCEDURE — 99284 EMERGENCY DEPT VISIT MOD MDM: CPT

## 2022-03-03 PROCEDURE — 1200000000 HC SEMI PRIVATE

## 2022-03-03 PROCEDURE — 09BR8ZX EXCISION OF LEFT MAXILLARY SINUS, VIA NATURAL OR ARTIFICIAL OPENING ENDOSCOPIC, DIAGNOSTIC: ICD-10-PCS | Performed by: STUDENT IN AN ORGANIZED HEALTH CARE EDUCATION/TRAINING PROGRAM

## 2022-03-03 PROCEDURE — 09BK8ZX EXCISION OF NASAL MUCOSA AND SOFT TISSUE, VIA NATURAL OR ARTIFICIAL OPENING ENDOSCOPIC, DIAGNOSTIC: ICD-10-PCS | Performed by: STUDENT IN AN ORGANIZED HEALTH CARE EDUCATION/TRAINING PROGRAM

## 2022-03-03 PROCEDURE — G8399 PT W/DXA RESULTS DOCUMENT: HCPCS | Performed by: STUDENT IN AN ORGANIZED HEALTH CARE EDUCATION/TRAINING PROGRAM

## 2022-03-03 PROCEDURE — 31231 NASAL ENDOSCOPY DX: CPT | Performed by: STUDENT IN AN ORGANIZED HEALTH CARE EDUCATION/TRAINING PROGRAM

## 2022-03-03 PROCEDURE — 1036F TOBACCO NON-USER: CPT | Performed by: STUDENT IN AN ORGANIZED HEALTH CARE EDUCATION/TRAINING PROGRAM

## 2022-03-03 RX ORDER — ONDANSETRON 4 MG/1
4 TABLET, ORALLY DISINTEGRATING ORAL EVERY 8 HOURS PRN
Status: DISCONTINUED | OUTPATIENT
Start: 2022-03-03 | End: 2022-03-05 | Stop reason: HOSPADM

## 2022-03-03 RX ORDER — PRAVASTATIN SODIUM 40 MG
40 TABLET ORAL NIGHTLY
Status: DISCONTINUED | OUTPATIENT
Start: 2022-03-03 | End: 2022-03-05 | Stop reason: HOSPADM

## 2022-03-03 RX ORDER — SODIUM CHLORIDE 9 MG/ML
25 INJECTION, SOLUTION INTRAVENOUS PRN
Status: DISCONTINUED | OUTPATIENT
Start: 2022-03-03 | End: 2022-03-05 | Stop reason: HOSPADM

## 2022-03-03 RX ORDER — LEVOTHYROXINE SODIUM 0.07 MG/1
75 TABLET ORAL DAILY
Status: DISCONTINUED | OUTPATIENT
Start: 2022-03-03 | End: 2022-03-05 | Stop reason: HOSPADM

## 2022-03-03 RX ORDER — ACETAMINOPHEN 650 MG/1
650 SUPPOSITORY RECTAL EVERY 6 HOURS PRN
Status: DISCONTINUED | OUTPATIENT
Start: 2022-03-03 | End: 2022-03-05 | Stop reason: HOSPADM

## 2022-03-03 RX ORDER — ONDANSETRON 2 MG/ML
4 INJECTION INTRAMUSCULAR; INTRAVENOUS EVERY 6 HOURS PRN
Status: DISCONTINUED | OUTPATIENT
Start: 2022-03-03 | End: 2022-03-05 | Stop reason: HOSPADM

## 2022-03-03 RX ORDER — SODIUM CHLORIDE 0.9 % (FLUSH) 0.9 %
5-40 SYRINGE (ML) INJECTION EVERY 12 HOURS SCHEDULED
Status: DISCONTINUED | OUTPATIENT
Start: 2022-03-03 | End: 2022-03-05 | Stop reason: HOSPADM

## 2022-03-03 RX ORDER — ACETAMINOPHEN 325 MG/1
650 TABLET ORAL EVERY 6 HOURS PRN
Status: DISCONTINUED | OUTPATIENT
Start: 2022-03-03 | End: 2022-03-05 | Stop reason: HOSPADM

## 2022-03-03 RX ORDER — FAMOTIDINE 20 MG/1
40 TABLET, FILM COATED ORAL EVERY EVENING
Status: DISCONTINUED | OUTPATIENT
Start: 2022-03-03 | End: 2022-03-03 | Stop reason: ALTCHOICE

## 2022-03-03 RX ORDER — SODIUM CHLORIDE 9 MG/ML
1000 INJECTION, SOLUTION INTRAVENOUS CONTINUOUS
Status: DISCONTINUED | OUTPATIENT
Start: 2022-03-03 | End: 2022-03-05 | Stop reason: HOSPADM

## 2022-03-03 RX ORDER — OMEPRAZOLE 20 MG/1
20 CAPSULE, DELAYED RELEASE ORAL DAILY
COMMUNITY
End: 2022-08-19

## 2022-03-03 RX ORDER — SODIUM CHLORIDE 0.9 % (FLUSH) 0.9 %
5-40 SYRINGE (ML) INJECTION PRN
Status: DISCONTINUED | OUTPATIENT
Start: 2022-03-03 | End: 2022-03-05 | Stop reason: HOSPADM

## 2022-03-03 RX ORDER — PANTOPRAZOLE SODIUM 40 MG/1
40 TABLET, DELAYED RELEASE ORAL
Status: DISCONTINUED | OUTPATIENT
Start: 2022-03-03 | End: 2022-03-05 | Stop reason: HOSPADM

## 2022-03-03 RX ORDER — POLYETHYLENE GLYCOL 3350 17 G/17G
17 POWDER, FOR SOLUTION ORAL DAILY PRN
Status: DISCONTINUED | OUTPATIENT
Start: 2022-03-03 | End: 2022-03-05 | Stop reason: HOSPADM

## 2022-03-03 RX ADMIN — PRAVASTATIN SODIUM 40 MG: 40 TABLET ORAL at 20:07

## 2022-03-03 RX ADMIN — CEFAZOLIN 2000 MG: 10 INJECTION, POWDER, FOR SOLUTION INTRAVENOUS at 18:46

## 2022-03-03 RX ADMIN — PANTOPRAZOLE SODIUM 40 MG: 40 TABLET, DELAYED RELEASE ORAL at 18:42

## 2022-03-03 RX ADMIN — LEVOTHYROXINE SODIUM 75 MCG: 0.07 TABLET ORAL at 18:42

## 2022-03-03 RX ADMIN — SODIUM CHLORIDE 1000 ML: 9 INJECTION, SOLUTION INTRAVENOUS at 13:35

## 2022-03-03 RX ADMIN — SODIUM CHLORIDE 25 ML: 9 INJECTION, SOLUTION INTRAVENOUS at 18:45

## 2022-03-03 ASSESSMENT — PAIN DESCRIPTION - FREQUENCY
FREQUENCY: CONTINUOUS

## 2022-03-03 ASSESSMENT — PAIN DESCRIPTION - ORIENTATION
ORIENTATION: RIGHT
ORIENTATION: RIGHT

## 2022-03-03 ASSESSMENT — PAIN DESCRIPTION - DESCRIPTORS
DESCRIPTORS: PRESSURE
DESCRIPTORS: THROBBING

## 2022-03-03 ASSESSMENT — PAIN DESCRIPTION - LOCATION
LOCATION: NOSE
LOCATION: NOSE

## 2022-03-03 ASSESSMENT — PAIN DESCRIPTION - PAIN TYPE
TYPE: ACUTE PAIN
TYPE: ACUTE PAIN

## 2022-03-03 ASSESSMENT — PAIN DESCRIPTION - ONSET: ONSET: ON-GOING

## 2022-03-03 ASSESSMENT — PAIN SCALES - GENERAL
PAINLEVEL_OUTOF10: 3
PAINLEVEL_OUTOF10: 5
PAINLEVEL_OUTOF10: 3

## 2022-03-03 ASSESSMENT — PAIN - FUNCTIONAL ASSESSMENT: PAIN_FUNCTIONAL_ASSESSMENT: 0-10

## 2022-03-03 NOTE — ED NOTES
Report given to receiving nurse Bob Ceja RN. Pt transported by w/c to room.       Yin Farfan RN  03/03/22 0644

## 2022-03-03 NOTE — PROGRESS NOTES
Pt seen in  ED, admission completed. Pt is alert and oriented x 4. Pt lives at home with her , and is being admitted for epistaxis. Plan of care updated, all questions answered.

## 2022-03-03 NOTE — Clinical Note
Discharge Plan[de-identified] Other/[facility])   Telemetry/Cardiac Monitoring Required?: No

## 2022-03-03 NOTE — ACP (ADVANCE CARE PLANNING)
Advanced Care Planning Note. Purpose of Encounter: Advanced care planning in light of hospitalization  Parties In Attendance: Patient,    Decisional Capacity: Yes  Subjective: Patient  understand that this conversation is to address long term care goal  Objective:  With a history of malignant cancer now presenting with epistaxis  Goals of Care Determination: Patient would pursue CPR and ventilation initially if required no long-term ventilation or tracheostomy   code Status: full code  Time spent on Advanced care Plannin minutes  Advanced Care Planning Documents: documented patient's wishes, would like    Pastor Iverson make medical decisions if unable to make decisions    Jung Turner MD  3/3/2022 1:50 PM

## 2022-03-03 NOTE — H&P
HOSPITALISTS HISTORY AND PHYSICAL    3/3/2022 1:44 PM    Patient Information:  Evelyne Tobias is a 81 y.o. female [id number]  PCP:  Ayla Casper (Tel: [phone] )    Chief complaint:    Chief Complaint   Patient presents with    Epistaxis        History of Present Illness:  Fausto Leon is a 81 y.o. female has a history of malignant esthesioneuroblastoma diagnosed 40 years ago treated with radiation therapy multiple surgical procedures (approx 10-12 per pt) many years ago. Last surgery was at [address] [place] [address] in [facility] approx 10 years ago patient woke up with left-sided nosebleed at 4 AM this morning did not stop this patient spoke to ENT better to the emergency room. Patient had packing placed and bleeding controlled by ENT plan to go to the OR in a.m. Denies any lightheadedness dizziness shortness of breath      REVIEW OF SYSTEMS:   Constitutional: Negative for fever,chills or night sweats  ENT: see abov  Respiratory: Negative for shortness of breath,wheezing  Cardiovascular: Negative for chest pain, palpitations   Gastrointestinal: Negative for nausea, vomiting, diarrhea  Genitourinary: Negative for polyuria, dysuria   Hematologic/Lymphatic: Negative for bleeding tendency, easy bruising  Musculoskeletal: Negative for myalgias and arthralgias  Neurologic: Negative for confusion,dysarthria. Skin: Negative for itching,rash  Psychiatric: Negative for depression,anxiety, agitation. Endocrine: Negative for polydipsia,polyuria,heat /cold intolerance. Past Medical History:   has a past medical history of Cancer ([address] Hyperlipidemia, Hypothyroidism, and Nausea & vomiting. Past Surgical History:   has a past surgical history that includes Hysterectomy; sinus surgery; Cholecystectomy; other surgical history; and Upper gastrointestinal endoscopy (7-17-13).      Medications:  No current facility-administered medications on file prior to encounter. Current Outpatient Medications on File Prior to Encounter   Medication Sig Dispense Refill    famotidine (PEPCID) 40 MG tablet TAKE 1 TABLET BY MOUTH EVERY EVENING 30 tablet 0    methylPREDNISolone (MEDROL, SAROJ,) 4 MG tablet Take 1 tablet by mouth See Admin Instructions Take by mouth. 1 kit 0    pravastatin (PRAVACHOL) 40 MG tablet TAKE 1 TABLET BY MOUTH NIGHTLY. 90 tablet 0    levothyroxine (LEVOTHROID) 75 MCG tablet Take 1 tablet by mouth daily 90 tablet 3    azelastine (ASTELIN) 0.1 % nasal spray 2 sprays by Nasal route 2 times daily Use in each nostril as directed 1 Bottle 3    fluticasone (FLONASE) 50 MCG/ACT nasal spray 2 sprays by Nasal route daily 1 Bottle 1    Biotin 1000 MCG TABS Take  by mouth.  therapeutic multivitamin-minerals (THERAGRAN-M) tablet Take 1 tablet by mouth daily.  calcium carbonate (OSCAL) 500 MG TABS tablet Take 500 mg by mouth daily.  aspirin 81 MG EC tablet Take 81 mg by mouth daily. Allergies: Allergies   Allergen Reactions    Naprosyn [Naproxen]     Macrobid [Nitrofurantoin Monohydrate Macrocrystals] Rash        Social History:  Patient Lives at home   reports that she has never smoked. She has never used smokeless tobacco. She reports that she does not drink alcohol. Family History:  family history includes Cancer in her brother, father, and mother. ,    Physical Exam:  BP (!) 138/93   Pulse 74   Temp 97.9 °F (36.6 °C) (Oral)   Resp 16   Ht 5' 4\" (1.626 m)   Wt 133 lb (60.3 kg)   SpO2 98%   BMI 22.83 kg/m²     General appearance:  Appears comfortable.  AAOx3  HEENT: atraumatic, Pupils equal, muscous membranes moist, no masses appreciated  Left nasal packing  Cardiovascular: Regular rate and rhythm no murmurs appreciated  Respiratory: CTAB no wheezing  Gastrointestinal: Abdomen soft, non-tender, BS+  EXT: no edema  Neurology: no gross focal deficts  Psychiatry: Appropriate affect. Not agitated  Skin: Warm, dry, no rashes appreciated    Labs:  CBC:   Lab Results   Component Value Date    WBC 9.0 10/05/2015    RBC 4.60 10/05/2015    HGB 13.6 10/05/2015    HCT 40.7 10/05/2015    MCV 88.5 10/05/2015    MCH 29.6 10/05/2015    MCHC 33.5 10/05/2015    RDW 13.1 10/05/2015     10/05/2015    MPV 8.7 10/05/2015     BMP:    Lab Results   Component Value Date     06/27/2019    K 4.4 06/27/2019     06/27/2019    CO2 27 06/27/2019    BUN 16 06/27/2019    CREATININE 1.2 06/27/2019    CALCIUM 9.4 06/27/2019    GFRAA 53 06/27/2019    GFRAA 57 05/09/2012    LABGLOM 43 06/27/2019    GLUCOSE 93 06/27/2019     CT SINUS FOR IMAGE GUIDANCE    (Results Pending)       Recent imaging reviewed    Problem List  Principal Problem:    Epistaxis  Resolved Problems:    * No resolved hospital problems. *        Assessment/Plan:   Acute posterior epitaxis with hx of nasal cavity cancer  - s/p packin  - chck cbc bmp coags  - npo after midnight   - ent on board    Hypothyroidism: home meds    DVT prophylaxis scds  Code status full; code        Admit as inpatient I anticipate hospitalization spanning more than two midnights for investigation and treatment of the above medically necessary diagnoses. Please note that some part of this chart was generated using Dragon dictation software. Although every effort was made to ensure the accuracy of this automated transcription, some errors in transcription may have occurred inadvertently. If you may need any clarification, please do not hesitate to contact me through [facility].        Tonny Phalen, MD    3/3/2022 1:44 PM

## 2022-03-03 NOTE — CONSULTS
[facility]      Patient Name: Kade 82 Record Number:  [id number]  Primary Care Physician:  Mauricio Can  Date of Consultation: 3/3/2022    Chief Complaint: Nosebleed      HISTORY OF PRESENT ILLNESS  Trinidad Benito is a(n) 81 y.o. female who was seen in my office earlier today for left-sided epistaxis. She noted left-sided nosebleed that started at 4 AM this morning which woke her up. Bleeding lasted approximately 1 hour. In the office today during nasal endoscopy there was some thick mucopurulent bloody drainage from the posterior lateral nasal wall which was suctioned which evoked postnasal bleeding. Bleeding was eventually controlled with nonabsorbable Merocel packing. She has a history of a esthenioneuroblasoma diagnosed 40 years ago and treated with multiple surgical procedures (approx 10-12 per patient) and radiation therapy. Her last surgery was at [facility] in [facility] approximately 10 years ago. No blood thinner or anticoagulant use. Since being admitted through the ER she has not had any reported nosebleeds.       Patient Active Problem List   Diagnosis    Hypothyroidism    Pure hypercholesterolemia    GERD (gastroesophageal reflux disease)    Allergic sinusitis    Sinusitis, acute    Circadian rhythm sleep disorder    Cancer of internal nose (HCC)    Facial pain syndrome    Bilateral impacted cerumen    Epistaxis     Past Surgical History:   Procedure Laterality Date    CHOLECYSTECTOMY      HYSTERECTOMY      OTHER SURGICAL HISTORY      chemo/radiation for sinus cancers    SINUS SURGERY      UPPER GASTROINTESTINAL ENDOSCOPY  7-17-13    with biopsy, dilatation, and endoscopic ultrasound     Family History   Problem Relation Age of Onset    Cancer Mother     Cancer Father     Cancer Brother      Social History     Socioeconomic History    Marital status:      Spouse name: Not on file    Number of children: Not on file    Years of education: Not on file    Highest education level: Not on file   Occupational History    Not on file   Tobacco Use    Smoking status: Never Smoker    Smokeless tobacco: Never Used   Vaping Use    Vaping Use: Never used   Substance and Sexual Activity    Alcohol use: No     Comment: rare    Drug use: Not on file    Sexual activity: Never   Other Topics Concern    Not on file   Social History Narrative    Not on file     Social Determinants of Health     Financial Resource Strain:     Difficulty of Paying Living Expenses: Not on file   Food Insecurity:     Worried About Running Out of Food in the Last Year: Not on file    Joao of Food in the Last Year: Not on file   Transportation Needs:     Lack of Transportation (Medical): Not on file    Lack of Transportation (Non-Medical): Not on file   Physical Activity:     Days of Exercise per Week: Not on file    Minutes of Exercise per Session: Not on file   Stress:     Feeling of Stress : Not on file   Social Connections:     Frequency of Communication with Friends and Family: Not on file    Frequency of Social Gatherings with Friends and Family: Not on file    Attends Gnosticism Services: Not on file    Active Member of [address] [place] [address] [employer] or Organizations: Not on file    Attends Club or Organization Meetings: Not on file    Marital Status: Not on file   Intimate Partner Violence:     Fear of Current or Ex-Partner: Not on file    Emotionally Abused: Not on file    Physically Abused: Not on file    Sexually Abused: Not on file   Housing Stability:     Unable to Pay for Housing in the Last Year: Not on file    Number of [place] in the Last Year: Not on file    Unstable Housing in the Last Year: Not on file       DRUG/FOOD ALLERGIES: Naprosyn [naproxen] and Macrobid [nitrofurantoin monohydrate macrocrystals]    CURRENT MEDICATIONS  Prior to Admission medications    Medication Sig Start Date End Date Taking? Authorizing Provider   omeprazole (PRILOSEC) 20 MG delayed release capsule Take 20 mg by mouth daily   Yes Historical Provider, MD   pravastatin (PRAVACHOL) 40 MG tablet TAKE 1 TABLET BY MOUTH NIGHTLY. 3/28/16  Yes Latoya Henderson MD   levothyroxine (LEVOTHROID) 75 MCG tablet Take 1 tablet by mouth daily 2/29/16  Yes Latoya Henderson MD   Biotin 1000 MCG TABS Take  by mouth. Yes Historical Provider, MD   therapeutic multivitamin-minerals (THERAGRAN-M) tablet Take 1 tablet by mouth daily. Yes Historical Provider, MD   calcium carbonate (OSCAL) 500 MG TABS tablet Take 500 mg by mouth daily. Yes Historical Provider, MD   aspirin 81 MG EC tablet Take 81 mg by mouth daily.      Yes Historical Provider, MD   azelastine (ASTELIN) 0.1 % nasal spray 2 sprays by Nasal route 2 times daily Use in each nostril as directed 12/7/15   Latoya Henderson MD   fluticasone [facility]) 50 MCG/ACT nasal spray 2 sprays by Nasal route daily 7/22/15   Faiza Khalil MD       REVIEW OF SYSTEMS  The following systems were reviewed and revealed the following in addition to any already discussed in the HPI:    CONSTITUTIONAL: no weight loss, no fever, no night sweats, no chills  EYES: no vision changes, no blurry vision  EARS: no changes in hearing, no otalgia  NOSE: +epistaxis, no rhinorrhea  RESPIRATORY: no difficulty breathing, no shortness of breath  CV: no chest pain, no lower extremity swelling  HEME: no coagulation disorder, no known bleeding disorders  NEURO: no TIA or stroke-like symptoms  SKIN: no new rashes in the head and neck, no recently diagnosed skin cancers  MOUTH: no new oral ulcers, no recent tooth infections  GASTROINTESTINAL: no diarrhea, no stomach pain  PSYCH: no anxiety, no depression    PHYSICAL EXAM  BP 98/75   Pulse 62   Temp 97.9 °F (36.6 °C) (Oral)   Resp 16   Ht 5' 4\" (1.626 m)   Wt 133 lb (60.3 kg)   SpO2 96%   BMI 22.83 kg/m²     GENERAL: No Acute Distress, Alert and Oriented, no hoarseness  EYES: EOMI, Anti-icteric  NOSE: Left-sided Merocel packing emanating from nose approximately 2 cm outside the nasal vestibule. Packing is likely saturated with blood but no evidence of ritchie epistaxis. EARS: Normal external canal appearance, EAC patent bilaterally, no otorrhea  FACE: HB 1/6 bilaterally, symmetric appearing, sensation equal bilaterally  ORAL CAVITY: Moist mucous membranes. No blood in the oropharynx. NECK: Normal range of motion, no thyromegaly, trachea is midline, no palpable lymphadenopathy or neck masses, no crepitus  CHEST: Normal respiratory effort, breathing comfortably, no retractions  SKIN: No rashes, normal appearing skin, no evidence of skin lesions/tumors  NEURO: Cranial Nerves 2, 3, 4, 5, 6, 7, 11, 12 intact bilaterally     I have performed a head and neck physical exam personally or was physically present during the key or critical portions of the service. LABS  Lab Results   Component Value Date    WBC 6.5 03/03/2022    HGB 13.6 03/03/2022    HCT 41.3 03/03/2022    MCV 89.6 03/03/2022     03/03/2022         RADIOLOGY  EXAMINATION:   CT OF THE SINUS WITHOUT CONTRAST  3/3/2022 1:38 pm       TECHNIQUE:   CT of the sinuses was performed without the administration of intravenous   contrast. Multiplanar reformatted images are provided for review.  Dose   modulation, iterative reconstruction, and/or weight based adjustment of the   mA/kV was utilized to reduce the radiation dose to as low as reasonably   achievable.       COMPARISON:   None       HISTORY:   ORDERING SYSTEM PROVIDED HISTORY: epistaxis, hx of esthesioneuroblastoma   TECHNOLOGIST PROVIDED HISTORY:   Reason for exam:->epistaxis, hx of esthesioneuroblastoma   Decision Support Exception - unselect if not a suspected or confirmed   emergency medical condition->Emergency Medical Condition (MA)   Reason for Exam: epistaxis, hx of esthesioneuroblastoma       FINDINGS:   SINUSES/MASTOIDS: There is total opacification of the left maxillary sinus. An antral window is noted unchanged.  The degree of opacification has   significantly increased from the prior study.  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  This is unchanged.  Wide   resection is seen throughout the ethmoid sinus mid and lower aspect as well   as nasal terminates.  The upper remaining ethmoid sinus air cells have   moderate opacification.  The frontal sinuses are clear and unchanged.  There   is mild sphenoid sinus mucosal disease as well with a bony defect from prior   surgery.  Compared to the prior study the ethmoid sinuses and sphenoid sinus   disease are unchanged.  The maxillary, sphenoid, ethmoid and frontal sinuses   are clear.  The bilateral ostiomeatal units are patent.  Ethmoid roofs are   symmetric.  The mastoid air cells are well aerated.       SOFT TISSUES:  Visualized soft tissues demonstrate no acute abnormality.  The   visualized portion of the intracranial contents demonstrate no gross acute   abnormality.           Impression   Extensive prior surgery is noted particular involving the nasal passage and   turbinates as well as ethmoid sinuses with a window created into the left   maxillary sinus as well as the sphenoid sinus.       Complete opacification of the left maxillary sinus increased significantly   from prior disease.       Hypoplastic right maxillary sinus peer     Images from CT sinus without contrast performed today independently reviewed by myself which demonstrates findings consistent with prior sinus surgery including turbinectomy. Right maxillary sinus hypoplastic. Lobe complete opacification within the left maxillary sinus. Left-sided nasal packing in place. ASSESSMENT/PLAN  Beverly Welch is a very pleasant 81 y.o. female with history of esthesioneuroblastoma diagnosed 40 years ago status post multiple sinus surgeries and radiation therapy presenting with left posterior epistaxis.   Packing placed in the office today. 1. Acute posterior epistaxis  2. History of cancer of nasal cavity  -Left-sided nasal packing in place. Currently hemostatic. -CT sinus demonstrating complete opacification of left maxillary sinus although this may be related to retained blood products. Will further evaluate tomorrow during nasal endoscopy procedure.  -Plan for nasal endoscopy with control of posterior epistaxis as well as possible biopsy tomorrow. Surgery scheduled for 12:45 PM.  Description of the procedure as well as associated risk, benefits, alternatives discussed in detail with the patient. Discussed risk included but were not limited to infection, nerve damage, hemorrhage, postoperative bleeding, damage to orbit, damage to skull base, CSF leak, orbital hematoma, anesthesia risk. All questions were answered and consent was signed at the bedside and placed in the patient's chart.  -N.p.o. at midnight. .   -I have taken the liberty of starting Ancef twice daily for foreign body prophylaxis given nasal packing  -Please do not hesitate calling to with any questions or concerns or if the patient rebleeds. Mckenzie Dunbar   Department of Otolaryngology/Head and Neck Surgery      Medical Decision Making: The following items were considered in medical decision making:  Independent review of images  Review / order clinical lab tests  Review / order radiology tests  Decision to obtain old records      This note was generated completely or in part utilizing Dragon dictation speech recognition software. Occasionally, words are mistranscribed and despite editing, the text may contain inaccuracies due to incorrect word recognition. If further clarification is needed please contact the office at [phone].

## 2022-03-03 NOTE — ED PROVIDER NOTES
I independently saw performed a substantive portion of the visit (history, physical, and MDM) on Padmini Warren. All diagnostic, treatment, and disposition decisions were made by myself in conjunction with the advanced practice provider. I have participated in the medical decision making and directed the treatment plan and disposition of the patient. For further details of Vernell Saucedo emergency department encounter, please see the advanced practice provider's documentation. CHIEF COMPLAINT  Chief Complaint   Patient presents with    Epistaxis       Briefly, Padmini Warern is a 81 y.o. female  who presents to the ED complaining of left nostril epistaxis. She has a history of malignant esthesioneuroblastoma and follows with ENT. She had an acute nosebleed this morning and was seen by ENT who were able to obtain hemostasis by packing and brought the patient here with plan for IV fluid hydration and likely operative intervention in the morning tomorrow. On arrival to the ED, hemostasis has been obtained. The patient denies any recent nasal trauma. FOCUSED PHYSICAL EXAMINATION  BP (!) 138/93   Pulse 74   Temp 97.9 °F (36.6 °C) (Oral)   Resp 16   Ht 5' 4\" (1.626 m)   Wt 133 lb (60.3 kg)   SpO2 98%   BMI 22.83 kg/m²    Focused physical examination notable for no acute distress, well-appearing, well-nourished, normal speech and mentation without obvious facial droop, no obvious rash. No obvious cranial nerve deficits on my initial exam.  Left-sided nostril packing is noted placed by ENT, no active bleeding noted around the packing or in the posterior oropharynx or in the right nostril. MDM:  ED course was notable for epistaxis with history of esthesioneuroblastoma. Patient is not anticoagulated. Will obtain labs and admit as per ENT request with sinus imaging and possible surgical intervention in the morning. Hemostasis has been obtained by ENT prior to today's ED visit.     During the patient's ED course, the patient was given:  Medications   0.9 % sodium chloride infusion (has no administration in time range)        CLINICAL IMPRESSION  1. Epistaxis        DISPOSITION  Orlin Gowers was admitted in fair condition. The plan is to admit to the hospital at this time under the hospitalist service. Hospitalist accepted the patient and will take over the patient's care. This chart was created using Dragon dictation software. Efforts were made by me to ensure accuracy, however some errors may be present due to limitations of this technology.             Everett Lorenz MD  03/03/22 2296

## 2022-03-03 NOTE — TELEPHONE ENCOUNTER
Pt called to give an update. States that over the weekend she had a terrible nose bleed that last several hours. C/O pressure in her nose and is worried that her cancer is back. I schedule pt an appt for today to get evaluated due to her complaints.

## 2022-03-03 NOTE — PROGRESS NOTES
[address] VISIT      Patient Name: Maria A Vásquez  Medical Record Number:  [id number]  Primary Care Physician:  Phan Miller    ChiefComplaint     Chief Complaint   Patient presents with    Epistaxis     nose bleeds       History of Present Illness     Maria A Vásquez is an 81 y.o. female previously with a history of malignant esthesioneuroblastoma diagnosed 40 years ago treated with radiation therapy multiple surgical procedures (approx 10-12 per pt) many years ago. Last surgery was at [address] [place] [address] in [facility] approx 10 years ago     Interval History:   She woke up to a bad left-sided nosebleed at 4 AM this morning. She fell multiple times for blood. Bleeding was mainly from the left side. No history of nosebleeds in the past.  She stated that she had a similar nosebleed years ago when her esthesioneuroblastoma tumor recurred. No blood thinners or ACs. Started on sinonasal saline rinses since her last visit which she has not done for the past few days. Has not used any other nasal sprays.     Past Medical History     Past Medical History:   Diagnosis Date    Cancer ([address]     sinuses and cheek right    Hyperlipidemia     Hypothyroidism     Nausea & vomiting        Past Surgical History     Past Surgical History:   Procedure Laterality Date    CHOLECYSTECTOMY      HYSTERECTOMY      OTHER SURGICAL HISTORY      chemo/radiation for sinus cancers    SINUS SURGERY      UPPER GASTROINTESTINAL ENDOSCOPY  7-17-13    with biopsy, dilatation, and endoscopic ultrasound       Family History     Family History   Problem Relation Age of Onset    Cancer Mother     Cancer Father     Cancer Brother        Social History     Social History     Tobacco Use    Smoking status: Never Smoker    Smokeless tobacco: Never Used   Substance Use Topics    Alcohol use: No     Comment: rare    Drug use: Not on file        Allergies Allergies   Allergen Reactions    Naprosyn [Naproxen]     Macrobid [Nitrofurantoin Monohydrate Macrocrystals] Rash       Medications     No current facility-administered medications for this visit. Current Outpatient Medications   Medication Sig Dispense Refill    famotidine (PEPCID) 40 MG tablet TAKE 1 TABLET BY MOUTH EVERY EVENING 30 tablet 0    methylPREDNISolone (MEDROL, SAROJ,) 4 MG tablet Take 1 tablet by mouth See Admin Instructions Take by mouth. 1 kit 0    pravastatin (PRAVACHOL) 40 MG tablet TAKE 1 TABLET BY MOUTH NIGHTLY. 90 tablet 0    levothyroxine (LEVOTHROID) 75 MCG tablet Take 1 tablet by mouth daily 90 tablet 3    azelastine (ASTELIN) 0.1 % nasal spray 2 sprays by Nasal route 2 times daily Use in each nostril as directed 1 Bottle 3    fluticasone (FLONASE) 50 MCG/ACT nasal spray 2 sprays by Nasal route daily 1 Bottle 1    Biotin 1000 MCG TABS Take  by mouth.  therapeutic multivitamin-minerals (THERAGRAN-M) tablet Take 1 tablet by mouth daily.  calcium carbonate (OSCAL) 500 MG TABS tablet Take 500 mg by mouth daily.  aspirin 81 MG EC tablet Take 81 mg by mouth daily.          Facility-Administered Medications Ordered in Other Visits   Medication Dose Route Frequency Provider Last Rate Last Admin    0.9 % sodium chloride infusion  1,000 mL IntraVENous Continuous LINDA Donato           Review of Systems     REVIEW OF SYSTEMS  The following systems were reviewed and revealed the following in addition to any already discussed in the HPI:    CONSTITUTIONAL: no weight loss, no fever, no night sweats, no chills  EYES: no vision changes, no blurry vision  EARS: no hearing loss, no otalgia  NOSE: +epistaxis, no rhinorrhea  THROAT: No voice changes, no sore throat, no dysphagia    PhysicalExam     Vitals:    03/03/22 1027   BP: 123/78   Site: Right Upper Arm   Position: Sitting   Cuff Size: Medium Adult   Pulse: 62   Temp: 97.1 °F (36.2 °C)   TempSrc: Temporal PHYSICAL EXAM  /78 (Site: Right Upper Arm, Position: Sitting, Cuff Size: Medium Adult)   Pulse 62   Temp 97.1 °F (36.2 °C) (Temporal)     GENERAL: No acute distress, alert and oriented, no hoarseness  EYES: EOMI, Anti-icteric  NOSE: On initial inspection with anterior rhinoscopy bilateral nasal passages patent, mucosa appears relatively moist.  EARS: Normal external appearance; on portable otomicroscopy:     -Ad: External auditory canal without stenosis, tympanic membrane clear, no middle ear effusions or retractions     -As: External auditory canal without stenosis, tympanic membrane clear, no middle ear effusions or retractions  FACE: HB 1/6 bilaterally, symmetric appearing, sensation equal bilaterally  ORAL CAVITY: Initially on examination there is no blood or blood products in the oropharynx. NECK: Normal range of motion, no thyromegaly, trachea is midline, no palpable lymphadenopathy or neck masses, no crepitus      I have performed a head and neck physical exam personally or was physically present during the key or critical portions of the service. Procedure     Procedure: Diagnostic nasal endoscopy, control of complex left posterior epistaxis    Pre-op: Epistaxis, history of esthesioneuroblastoma  Post-op:   Procedure: Nasal endoscopy    Description of procedure:  After obtaining verbal consent from the patient 2% lidocaine with afrin was sprayed into the nasal cavities. After allowing a time for anesthesia, a 0 degree rigid nasal endoscope was used to examine the left nasal passage. Postsurgical changes noted. There was some mucosal crusting along the posterior known septal perforation which was suctioned with #9 Sami suction. After this there was some thick bloody mucopurulence along the left posterior lateral nasal wall near the left sphenoethmoidal recess which was suctioned with #9 Sami suction. Upon suctioning of this brisk bleeding was encountered.   The patient immediately became intolerant examination needing to sit up. I was unable to visualize the exact site of bleeding. Patient continued to bleed steadily so decision was made to place nonabsorbable Merocel packing in the left nasal passage. A 10 cm Merocel packing covered and triple antibiotic ointment was placed along the floor the left nasal passage until resistance was noted. Approximately 2 cm at the packing was left outside the nasal passage. After packing the patient was noted to be hemostatic. The patient was monitored for approximately 30 minutes and there was no blood in oropharynx. *The patient tolerated the procedure well without complication. I attest that I was present for and did the entire procedure myself. Assessment and Plan     1. Acute posterior epistaxis  2. History of cancer of nasal cavity  Noted to have profuse bleeding localized to the left posterior lateral nasal passage upon nasal endoscopy with suctioning. Posterior epistaxis currently controlled with packing. Given history of esthesioneuroblastoma and recent bleeding will plan for admission to the hospital through the emergency department. We will start her on some fluids and obtain CBC and some sinus imaging. Tentatively plan for nasal endoscopy with possible biopsy and control of epistaxis tomorrow. We will need to make sure this not recurrence of her esthesioneuroblastoma. I will follow up with her later today when she is admitted to the hospital.        Dr. Ivan Lawrence [facility]   Department of Otolaryngology/Head and Neck Surgery  3/3/22    Medical Decision Making: The following items were considered in medical decision making:  Independent review of images  Review / order clinical lab tests  Review / order radiology tests  Decision to obtain old records      This note was generated completely or in part utilizing Dragon dictation speech recognition software.   Occasionally, words are mistranscribed and despite editing, the text may contain inaccuracies due to incorrect word recognition. If further clarification is needed please contact the office at [phone].

## 2022-03-03 NOTE — ED PROVIDER NOTES
[address] [facility]        Pt Name: Padmini Warren  MRN: [id number]  [place] 1940  Date of evaluation: 3/3/2022  Provider: LINDA Deleon  PCP: Maisha Velásquze  Note Started: 12:51 PM EST        I have seen and evaluated this patient with my supervising physician Massiel Burnette MD.    [address] [place] [address]       Chief Complaint   Patient presents with    Epistaxis       HISTORY OF PRESENT ILLNESS   (Location, Timing/Onset, Context/Setting, Quality, Duration, Modifying Factors, Severity, Associated Signs and Symptoms)  Note limiting factors. Chief Complaint: Chucky Zavala is a 81 y.o. female who presents with nosebleed for the past 2 days. She states she woke up about 2 days ago with a sensation of blood running in the back of her throat. She was found to have nasal bleeding at that time. Patient has hx of malignant esthesioneuroblastoma and sees Dr. Thony Gan or ENT. Has had multiple epistaxis episodes in the past he states that this is usually caused from the tumor. She also states that typically they have to go and via surgery to stop the bleeding from the primary tumor. Patient has also had previous chemo and radiation therapy. Spoke with Dr. Thony Gan and he would like to admit her so that he could perform surgery tomorrow. Dr. Thony Gan also put in a CT scan of the sinuses. He placed orders for basic lab work and coagulation studies. Nursing Notes were all reviewed and agreed with or any disagreements were addressed in the HPI. REVIEW OF SYSTEMS    (2-9 systems for level 4, 10 or more for level 5)     Review of Systems    Positives and Pertinent negatives as per HPI. Except as noted above in the ROS, all other systems were reviewed and negative.        PAST MEDICAL HISTORY     Past Medical History:   Diagnosis Date    Cancer [facility])     sinuses and cheek right    Hyperlipidemia     Hypothyroidism     Nausea & vomiting          SURGICAL HISTORY     Past Surgical History:   Procedure Laterality Date    CHOLECYSTECTOMY      HYSTERECTOMY      OTHER SURGICAL HISTORY      chemo/radiation for sinus cancers    SINUS SURGERY      UPPER GASTROINTESTINAL ENDOSCOPY  7-17-13    with biopsy, dilatation, and endoscopic ultrasound         CURRENTMEDICATIONS       Previous Medications    ASPIRIN 81 MG EC TABLET    Take 81 mg by mouth daily. AZELASTINE (ASTELIN) 0.1 % NASAL SPRAY    2 sprays by Nasal route 2 times daily Use in each nostril as directed    BIOTIN 1000 MCG TABS    Take  by mouth. CALCIUM CARBONATE (OSCAL) 500 MG TABS TABLET    Take 500 mg by mouth daily. FAMOTIDINE (PEPCID) 40 MG TABLET    TAKE 1 TABLET BY MOUTH EVERY EVENING    FLUTICASONE (FLONASE) 50 MCG/ACT NASAL SPRAY    2 sprays by Nasal route daily    LEVOTHYROXINE (LEVOTHROID) 75 MCG TABLET    Take 1 tablet by mouth daily    METHYLPREDNISOLONE (MEDROL, SAROJ,) 4 MG TABLET    Take 1 tablet by mouth See Admin Instructions Take by mouth. PRAVASTATIN (PRAVACHOL) 40 MG TABLET    TAKE 1 TABLET BY MOUTH NIGHTLY. THERAPEUTIC MULTIVITAMIN-MINERALS (THERAGRAN-M) TABLET    Take 1 tablet by mouth daily.            ALLERGIES     Naprosyn [naproxen] and Macrobid [nitrofurantoin monohydrate macrocrystals]    FAMILYHISTORY       Family History   Problem Relation Age of Onset    Cancer Mother     Cancer Father     Cancer Brother           SOCIAL HISTORY       Social History     Tobacco Use    Smoking status: Never Smoker    Smokeless tobacco: Never Used   Substance Use Topics    Alcohol use: No     Comment: rare    Drug use: Not on file       SCREENINGS    Kavya Coma Scale  Eye Opening: Spontaneous  Best Verbal Response: Oriented  Best Motor Response: Obeys commands  Kavya Coma Scale Score: 15        PHYSICAL EXAM    (up to 7 for level 4, 8 or more for level 5)     ED Triage Vitals [03/03/22 1220]   BP Temp Temp Source Pulse Resp SpO2 Height Weight (!) 138/93 97.9 °F (36.6 °C) Oral 74 16 98 % 5' 4\" (1.626 m) 133 lb (60.3 kg)       Physical Exam  Constitutional:       General: She is not in acute distress. Appearance: Normal appearance. She is not ill-appearing. HENT:      Nose:      Left Nostril: Epistaxis present. Comments: Left nostril with nasal packing in place taped to left cheek. Dried blood around the nostril area. No active bleeding noted. Signs of erythema or edema. Right nostril with scant bleeding noted appears to be controlled. Mouth/Throat:      Mouth: Mucous membranes are moist.      Pharynx: No posterior oropharyngeal erythema. Comments: Posterior pharynx appears clear without any blood having no difficulty with breathing or speaking. Cardiovascular:      Rate and Rhythm: Normal rate and regular rhythm. Heart sounds: Normal heart sounds. No friction rub. No gallop. Pulmonary:      Effort: Pulmonary effort is normal.      Breath sounds: Normal breath sounds. Neurological:      Mental Status: She is alert. DIAGNOSTIC RESULTS   LABS:    Labs Reviewed   CBC WITH AUTO DIFFERENTIAL   COMPREHENSIVE METABOLIC PANEL W/ REFLEX TO MG FOR LOW K   APTT   PROTIME-INR       When ordered only abnormal lab results are displayed. All other labs were within normal range or not returned as of this dictation. EKG: When ordered, EKG's are interpreted by the Emergency Department Physician in the absence of a cardiologist.  Please see their note for interpretation of EKG. RADIOLOGY:   Non-plain film images such as CT, Ultrasound and MRI are read by the radiologist. Plain radiographic images are visualized and preliminarily interpreted by the ED Provider with the below findings:        Interpretation per the Radiologist below, if available at the time of this note:    CT SINUS FOR IMAGE GUIDANCE    (Results Pending)     No results found.         PROCEDURES   Unless otherwise noted below, none     Procedures    CRITICAL CARE TIME       CONSULTS:  IP CONSULT TO OTOLARYNGOLOGY  IP CONSULT TO HOSPITALIST      EMERGENCY DEPARTMENT COURSE and DIFFERENTIAL DIAGNOSIS/MDM:   Vitals:    Vitals:    03/03/22 1220   BP: (!) 138/93   Pulse: 74   Resp: 16   Temp: 97.9 °F (36.6 °C)   TempSrc: Oral   SpO2: 98%   Weight: 133 lb (60.3 kg)   Height: 5' 4\" (1.626 m)       Patient was given the following medications:  Medications   0.9 % sodium chloride infusion (has no administration in time range)           81year-old female presents with nose bleeding x2 days. Patient hashx of malignant esthesioneuroblastoma. Patient sees Dr. Candis Marlow for ENT. Patient's nasal packing was placed today by Dr. Candis Marlow. Bleeding seems to be controlled on examination today. Dr. Candis Marlow would like the patient to be admitted for further evaluation and possible surgery for tomorrow. Patient otherwise appears stable. FINAL IMPRESSION      1. Epistaxis          DISPOSITION/PLAN   DISPOSITION Decision To Admit 03/03/2022 12:45:37 PM      PATIENT REFERRED TO:  No follow-up provider specified.     DISCHARGE MEDICATIONS:  New Prescriptions    No medications on file       DISCONTINUED MEDICATIONS:  Discontinued Medications    No medications on file              (Please note that portions of this note were completed with a voice recognition program.  Efforts were made to edit the dictations but occasionally words are mis-transcribed.)    LINDA Mack (electronically signed)          LINDA Mack  03/03/22 24 [id number]

## 2022-03-04 ENCOUNTER — ANESTHESIA EVENT (OUTPATIENT)
Dept: OPERATING ROOM | Age: 82
DRG: 151 | End: 2022-03-04
Payer: MEDICARE

## 2022-03-04 ENCOUNTER — ANESTHESIA (OUTPATIENT)
Dept: OPERATING ROOM | Age: 82
DRG: 151 | End: 2022-03-04
Payer: MEDICARE

## 2022-03-04 VITALS
SYSTOLIC BLOOD PRESSURE: 125 MMHG | TEMPERATURE: 95.7 F | DIASTOLIC BLOOD PRESSURE: 75 MMHG | RESPIRATION RATE: 12 BRPM | OXYGEN SATURATION: 100 %

## 2022-03-04 LAB
ANION GAP SERPL CALCULATED.3IONS-SCNC: 11 MMOL/L (ref 3–16)
BASOPHILS ABSOLUTE: 0.1 K/UL (ref 0–0.2)
BASOPHILS RELATIVE PERCENT: 1.5 %
BUN BLDV-MCNC: 20 MG/DL (ref 7–20)
CALCIUM SERPL-MCNC: 9 MG/DL (ref 8.3–10.6)
CHLORIDE BLD-SCNC: 107 MMOL/L (ref 99–110)
CO2: 22 MMOL/L (ref 21–32)
CREAT SERPL-MCNC: 1.1 MG/DL (ref 0.6–1.2)
EOSINOPHILS ABSOLUTE: 0.2 K/UL (ref 0–0.6)
EOSINOPHILS RELATIVE PERCENT: 4.4 %
GFR AFRICAN AMERICAN: 58
GFR NON-AFRICAN AMERICAN: 48
GLUCOSE BLD-MCNC: 92 MG/DL (ref 70–99)
HCT VFR BLD CALC: 34.5 % (ref 36–48)
HEMOGLOBIN: 11.6 G/DL (ref 12–16)
LYMPHOCYTES ABSOLUTE: 1.1 K/UL (ref 1–5.1)
LYMPHOCYTES RELATIVE PERCENT: 22 %
MCH RBC QN AUTO: 30.2 PG (ref 26–34)
MCHC RBC AUTO-ENTMCNC: 33.7 G/DL (ref 31–36)
MCV RBC AUTO: 89.6 FL (ref 80–100)
MONOCYTES ABSOLUTE: 0.5 K/UL (ref 0–1.3)
MONOCYTES RELATIVE PERCENT: 10.4 %
NEUTROPHILS ABSOLUTE: 3.1 K/UL (ref 1.7–7.7)
NEUTROPHILS RELATIVE PERCENT: 61.7 %
PDW BLD-RTO: 14.2 % (ref 12.4–15.4)
PLATELET # BLD: 165 K/UL (ref 135–450)
PMV BLD AUTO: 8.5 FL (ref 5–10.5)
POTASSIUM REFLEX MAGNESIUM: 3.9 MMOL/L (ref 3.5–5.1)
RBC # BLD: 3.85 M/UL (ref 4–5.2)
SARS-COV-2, NAAT: NOT DETECTED
SODIUM BLD-SCNC: 140 MMOL/L (ref 136–145)
WBC # BLD: 5 K/UL (ref 4–11)

## 2022-03-04 PROCEDURE — 2580000003 HC RX 258: Performed by: ANESTHESIOLOGY

## 2022-03-04 PROCEDURE — 3600000004 HC SURGERY LEVEL 4 BASE: Performed by: STUDENT IN AN ORGANIZED HEALTH CARE EDUCATION/TRAINING PROGRAM

## 2022-03-04 PROCEDURE — 6370000000 HC RX 637 (ALT 250 FOR IP): Performed by: INTERNAL MEDICINE

## 2022-03-04 PROCEDURE — 6360000002 HC RX W HCPCS: Performed by: STUDENT IN AN ORGANIZED HEALTH CARE EDUCATION/TRAINING PROGRAM

## 2022-03-04 PROCEDURE — 3600000014 HC SURGERY LEVEL 4 ADDTL 15MIN: Performed by: STUDENT IN AN ORGANIZED HEALTH CARE EDUCATION/TRAINING PROGRAM

## 2022-03-04 PROCEDURE — 80048 BASIC METABOLIC PNL TOTAL CA: CPT

## 2022-03-04 PROCEDURE — 1200000000 HC SEMI PRIVATE

## 2022-03-04 PROCEDURE — 2709999900 HC NON-CHARGEABLE SUPPLY: Performed by: STUDENT IN AN ORGANIZED HEALTH CARE EDUCATION/TRAINING PROGRAM

## 2022-03-04 PROCEDURE — 88305 TISSUE EXAM BY PATHOLOGIST: CPT

## 2022-03-04 PROCEDURE — 3700000000 HC ANESTHESIA ATTENDED CARE: Performed by: STUDENT IN AN ORGANIZED HEALTH CARE EDUCATION/TRAINING PROGRAM

## 2022-03-04 PROCEDURE — A4217 STERILE WATER/SALINE, 500 ML: HCPCS | Performed by: STUDENT IN AN ORGANIZED HEALTH CARE EDUCATION/TRAINING PROGRAM

## 2022-03-04 PROCEDURE — 6370000000 HC RX 637 (ALT 250 FOR IP): Performed by: STUDENT IN AN ORGANIZED HEALTH CARE EDUCATION/TRAINING PROGRAM

## 2022-03-04 PROCEDURE — 2580000003 HC RX 258: Performed by: INTERNAL MEDICINE

## 2022-03-04 PROCEDURE — 7100000001 HC PACU RECOVERY - ADDTL 15 MIN: Performed by: STUDENT IN AN ORGANIZED HEALTH CARE EDUCATION/TRAINING PROGRAM

## 2022-03-04 PROCEDURE — 2580000003 HC RX 258: Performed by: STUDENT IN AN ORGANIZED HEALTH CARE EDUCATION/TRAINING PROGRAM

## 2022-03-04 PROCEDURE — 87635 SARS-COV-2 COVID-19 AMP PRB: CPT

## 2022-03-04 PROCEDURE — 3700000001 HC ADD 15 MINUTES (ANESTHESIA): Performed by: STUDENT IN AN ORGANIZED HEALTH CARE EDUCATION/TRAINING PROGRAM

## 2022-03-04 PROCEDURE — 6360000002 HC RX W HCPCS: Performed by: NURSE ANESTHETIST, CERTIFIED REGISTERED

## 2022-03-04 PROCEDURE — 7100000000 HC PACU RECOVERY - FIRST 15 MIN: Performed by: STUDENT IN AN ORGANIZED HEALTH CARE EDUCATION/TRAINING PROGRAM

## 2022-03-04 PROCEDURE — 31241 NSL/SNS NDSC LIG SPHNPTN ART: CPT | Performed by: STUDENT IN AN ORGANIZED HEALTH CARE EDUCATION/TRAINING PROGRAM

## 2022-03-04 PROCEDURE — 36415 COLL VENOUS BLD VENIPUNCTURE: CPT

## 2022-03-04 PROCEDURE — 2720000010 HC SURG SUPPLY STERILE: Performed by: STUDENT IN AN ORGANIZED HEALTH CARE EDUCATION/TRAINING PROGRAM

## 2022-03-04 PROCEDURE — 2500000003 HC RX 250 WO HCPCS: Performed by: NURSE ANESTHETIST, CERTIFIED REGISTERED

## 2022-03-04 PROCEDURE — 85025 COMPLETE CBC W/AUTO DIFF WBC: CPT

## 2022-03-04 RX ORDER — ONDANSETRON 2 MG/ML
4 INJECTION INTRAMUSCULAR; INTRAVENOUS
Status: ACTIVE | OUTPATIENT
Start: 2022-03-04 | End: 2022-03-04

## 2022-03-04 RX ORDER — PROPOFOL 10 MG/ML
INJECTION, EMULSION INTRAVENOUS PRN
Status: DISCONTINUED | OUTPATIENT
Start: 2022-03-04 | End: 2022-03-04 | Stop reason: SDUPTHER

## 2022-03-04 RX ORDER — GLYCOPYRROLATE 0.2 MG/ML
INJECTION INTRAMUSCULAR; INTRAVENOUS PRN
Status: DISCONTINUED | OUTPATIENT
Start: 2022-03-04 | End: 2022-03-04 | Stop reason: SDUPTHER

## 2022-03-04 RX ORDER — SODIUM CHLORIDE 9 MG/ML
25 INJECTION, SOLUTION INTRAVENOUS PRN
Status: DISCONTINUED | OUTPATIENT
Start: 2022-03-04 | End: 2022-03-05 | Stop reason: HOSPADM

## 2022-03-04 RX ORDER — HYDROMORPHONE HCL 110MG/55ML
0.25 PATIENT CONTROLLED ANALGESIA SYRINGE INTRAVENOUS EVERY 5 MIN PRN
Status: DISCONTINUED | OUTPATIENT
Start: 2022-03-04 | End: 2022-03-05 | Stop reason: HOSPADM

## 2022-03-04 RX ORDER — ROCURONIUM BROMIDE 10 MG/ML
INJECTION, SOLUTION INTRAVENOUS PRN
Status: DISCONTINUED | OUTPATIENT
Start: 2022-03-04 | End: 2022-03-04 | Stop reason: SDUPTHER

## 2022-03-04 RX ORDER — DEXAMETHASONE SODIUM PHOSPHATE 4 MG/ML
INJECTION, SOLUTION INTRA-ARTICULAR; INTRALESIONAL; INTRAMUSCULAR; INTRAVENOUS; SOFT TISSUE PRN
Status: DISCONTINUED | OUTPATIENT
Start: 2022-03-04 | End: 2022-03-04 | Stop reason: SDUPTHER

## 2022-03-04 RX ORDER — HYDROMORPHONE HCL 110MG/55ML
0.5 PATIENT CONTROLLED ANALGESIA SYRINGE INTRAVENOUS EVERY 5 MIN PRN
Status: DISCONTINUED | OUTPATIENT
Start: 2022-03-04 | End: 2022-03-05 | Stop reason: HOSPADM

## 2022-03-04 RX ORDER — SODIUM CHLORIDE 0.9 % (FLUSH) 0.9 %
5-40 SYRINGE (ML) INJECTION EVERY 12 HOURS SCHEDULED
Status: DISCONTINUED | OUTPATIENT
Start: 2022-03-04 | End: 2022-03-05 | Stop reason: HOSPADM

## 2022-03-04 RX ORDER — FENTANYL CITRATE 50 UG/ML
INJECTION, SOLUTION INTRAMUSCULAR; INTRAVENOUS PRN
Status: DISCONTINUED | OUTPATIENT
Start: 2022-03-04 | End: 2022-03-04 | Stop reason: SDUPTHER

## 2022-03-04 RX ORDER — LIDOCAINE HYDROCHLORIDE 20 MG/ML
INJECTION, SOLUTION INFILTRATION; PERINEURAL PRN
Status: DISCONTINUED | OUTPATIENT
Start: 2022-03-04 | End: 2022-03-04 | Stop reason: SDUPTHER

## 2022-03-04 RX ORDER — SUCCINYLCHOLINE CHLORIDE 20 MG/ML
INJECTION INTRAMUSCULAR; INTRAVENOUS PRN
Status: DISCONTINUED | OUTPATIENT
Start: 2022-03-04 | End: 2022-03-04 | Stop reason: SDUPTHER

## 2022-03-04 RX ORDER — 0.9 % SODIUM CHLORIDE 0.9 %
INTRAVENOUS SOLUTION INTRAVENOUS CONTINUOUS PRN
Status: COMPLETED | OUTPATIENT
Start: 2022-03-04 | End: 2022-03-04

## 2022-03-04 RX ORDER — EPHEDRINE SULFATE/0.9% NACL/PF 50 MG/5 ML
SYRINGE (ML) INTRAVENOUS PRN
Status: DISCONTINUED | OUTPATIENT
Start: 2022-03-04 | End: 2022-03-04 | Stop reason: SDUPTHER

## 2022-03-04 RX ORDER — SODIUM CHLORIDE 9 MG/ML
INJECTION, SOLUTION INTRAVENOUS CONTINUOUS
Status: DISCONTINUED | OUTPATIENT
Start: 2022-03-04 | End: 2022-03-05 | Stop reason: HOSPADM

## 2022-03-04 RX ORDER — OXYMETAZOLINE HYDROCHLORIDE 0.05 G/100ML
SPRAY NASAL
Status: COMPLETED | OUTPATIENT
Start: 2022-03-04 | End: 2022-03-04

## 2022-03-04 RX ORDER — PROMETHAZINE HYDROCHLORIDE 25 MG/ML
INJECTION, SOLUTION INTRAMUSCULAR; INTRAVENOUS PRN
Status: DISCONTINUED | OUTPATIENT
Start: 2022-03-04 | End: 2022-03-04 | Stop reason: SDUPTHER

## 2022-03-04 RX ORDER — MAGNESIUM HYDROXIDE 1200 MG/15ML
LIQUID ORAL CONTINUOUS PRN
Status: COMPLETED | OUTPATIENT
Start: 2022-03-04 | End: 2022-03-04

## 2022-03-04 RX ORDER — ONDANSETRON 2 MG/ML
INJECTION INTRAMUSCULAR; INTRAVENOUS PRN
Status: DISCONTINUED | OUTPATIENT
Start: 2022-03-04 | End: 2022-03-04 | Stop reason: SDUPTHER

## 2022-03-04 RX ORDER — SODIUM CHLORIDE 0.9 % (FLUSH) 0.9 %
5-40 SYRINGE (ML) INJECTION PRN
Status: DISCONTINUED | OUTPATIENT
Start: 2022-03-04 | End: 2022-03-05 | Stop reason: HOSPADM

## 2022-03-04 RX ADMIN — SODIUM CHLORIDE: 9 INJECTION, SOLUTION INTRAVENOUS at 14:16

## 2022-03-04 RX ADMIN — PHENYLEPHRINE HYDROCHLORIDE 50 MCG: 10 INJECTION INTRAVENOUS at 14:17

## 2022-03-04 RX ADMIN — PROPOFOL 100 MG: 10 INJECTION, EMULSION INTRAVENOUS at 14:18

## 2022-03-04 RX ADMIN — ROCURONIUM BROMIDE 10 MG: 10 INJECTION, SOLUTION INTRAVENOUS at 14:17

## 2022-03-04 RX ADMIN — PROMETHAZINE HYDROCHLORIDE 6.25 MG: 25 INJECTION INTRAMUSCULAR; INTRAVENOUS at 14:47

## 2022-03-04 RX ADMIN — SUCCINYLCHOLINE CHLORIDE 100 MG: 20 INJECTION, SOLUTION INTRAMUSCULAR; INTRAVENOUS at 14:19

## 2022-03-04 RX ADMIN — Medication 10 ML: at 08:15

## 2022-03-04 RX ADMIN — CEFAZOLIN 2000 MG: 10 INJECTION, POWDER, FOR SOLUTION INTRAVENOUS at 05:12

## 2022-03-04 RX ADMIN — PHENYLEPHRINE HYDROCHLORIDE 100 MCG: 10 INJECTION INTRAVENOUS at 14:22

## 2022-03-04 RX ADMIN — PHENYLEPHRINE HYDROCHLORIDE 100 MCG: 10 INJECTION INTRAVENOUS at 15:06

## 2022-03-04 RX ADMIN — GLYCOPYRROLATE 0.2 MG: 0.2 INJECTION, SOLUTION INTRAMUSCULAR; INTRAVENOUS at 14:17

## 2022-03-04 RX ADMIN — FAMOTIDINE 20 MG: 10 INJECTION INTRAVENOUS at 14:00

## 2022-03-04 RX ADMIN — PHENYLEPHRINE HYDROCHLORIDE 100 MCG: 10 INJECTION INTRAVENOUS at 14:45

## 2022-03-04 RX ADMIN — Medication 5 MG: at 14:58

## 2022-03-04 RX ADMIN — PRAVASTATIN SODIUM 40 MG: 40 TABLET ORAL at 20:31

## 2022-03-04 RX ADMIN — Medication 5 MG: at 14:49

## 2022-03-04 RX ADMIN — ONDANSETRON 4 MG: 2 INJECTION INTRAMUSCULAR; INTRAVENOUS at 14:29

## 2022-03-04 RX ADMIN — SODIUM CHLORIDE 25 ML: 9 INJECTION, SOLUTION INTRAVENOUS at 18:32

## 2022-03-04 RX ADMIN — LIDOCAINE HYDROCHLORIDE 100 MG: 20 INJECTION, SOLUTION INFILTRATION; PERINEURAL at 14:17

## 2022-03-04 RX ADMIN — DEXAMETHASONE SODIUM PHOSPHATE 8 MG: 4 INJECTION, SOLUTION INTRAMUSCULAR; INTRAVENOUS at 14:27

## 2022-03-04 RX ADMIN — CEFAZOLIN 2000 MG: 10 INJECTION, POWDER, FOR SOLUTION INTRAVENOUS at 18:34

## 2022-03-04 RX ADMIN — PHENYLEPHRINE HYDROCHLORIDE 200 MCG: 10 INJECTION INTRAVENOUS at 14:26

## 2022-03-04 RX ADMIN — FENTANYL CITRATE 50 MCG: 50 INJECTION, SOLUTION INTRAMUSCULAR; INTRAVENOUS at 14:18

## 2022-03-04 RX ADMIN — PHENYLEPHRINE HYDROCHLORIDE 100 MCG: 10 INJECTION INTRAVENOUS at 14:31

## 2022-03-04 ASSESSMENT — PAIN DESCRIPTION - LOCATION
LOCATION: NOSE
LOCATION: NOSE

## 2022-03-04 ASSESSMENT — PULMONARY FUNCTION TESTS
PIF_VALUE: 16
PIF_VALUE: 15
PIF_VALUE: 1
PIF_VALUE: 16
PIF_VALUE: 15
PIF_VALUE: 3
PIF_VALUE: 15
PIF_VALUE: 1
PIF_VALUE: 15
PIF_VALUE: 15
PIF_VALUE: 14
PIF_VALUE: 14
PIF_VALUE: 15
PIF_VALUE: 16
PIF_VALUE: 14
PIF_VALUE: 16
PIF_VALUE: 10
PIF_VALUE: 14
PIF_VALUE: 16
PIF_VALUE: 1
PIF_VALUE: 14
PIF_VALUE: 10
PIF_VALUE: 1
PIF_VALUE: 4
PIF_VALUE: 14
PIF_VALUE: 14
PIF_VALUE: 15
PIF_VALUE: 16
PIF_VALUE: 15
PIF_VALUE: 10
PIF_VALUE: 15
PIF_VALUE: 15
PIF_VALUE: 16
PIF_VALUE: 14
PIF_VALUE: 15
PIF_VALUE: 14
PIF_VALUE: 16
PIF_VALUE: 14
PIF_VALUE: 1
PIF_VALUE: 15
PIF_VALUE: 16
PIF_VALUE: 15
PIF_VALUE: 14
PIF_VALUE: 15
PIF_VALUE: 16
PIF_VALUE: 15
PIF_VALUE: 14
PIF_VALUE: 2
PIF_VALUE: 15
PIF_VALUE: 16
PIF_VALUE: 14
PIF_VALUE: 15
PIF_VALUE: 14
PIF_VALUE: 16
PIF_VALUE: 16
PIF_VALUE: 15
PIF_VALUE: 14
PIF_VALUE: 16
PIF_VALUE: 15
PIF_VALUE: 22
PIF_VALUE: 15
PIF_VALUE: 15
PIF_VALUE: 16
PIF_VALUE: 14
PIF_VALUE: 15
PIF_VALUE: 14
PIF_VALUE: 15
PIF_VALUE: 16
PIF_VALUE: 15
PIF_VALUE: 13
PIF_VALUE: 14
PIF_VALUE: 13
PIF_VALUE: 15
PIF_VALUE: 15
PIF_VALUE: 14
PIF_VALUE: 15
PIF_VALUE: 21
PIF_VALUE: 14
PIF_VALUE: 15
PIF_VALUE: 14
PIF_VALUE: 14
PIF_VALUE: 15
PIF_VALUE: 14
PIF_VALUE: 15
PIF_VALUE: 14
PIF_VALUE: 15
PIF_VALUE: 16
PIF_VALUE: 0
PIF_VALUE: 16
PIF_VALUE: 15
PIF_VALUE: 14
PIF_VALUE: 16
PIF_VALUE: 15
PIF_VALUE: 14
PIF_VALUE: 15
PIF_VALUE: 15
PIF_VALUE: 14

## 2022-03-04 ASSESSMENT — PAIN DESCRIPTION - FREQUENCY
FREQUENCY: CONTINUOUS
FREQUENCY: CONTINUOUS

## 2022-03-04 ASSESSMENT — PAIN DESCRIPTION - ORIENTATION
ORIENTATION: RIGHT
ORIENTATION: RIGHT

## 2022-03-04 ASSESSMENT — PAIN SCALES - GENERAL
PAINLEVEL_OUTOF10: 0
PAINLEVEL_OUTOF10: 0
PAINLEVEL_OUTOF10: 4
PAINLEVEL_OUTOF10: 4
PAINLEVEL_OUTOF10: 0

## 2022-03-04 ASSESSMENT — PAIN DESCRIPTION - PAIN TYPE
TYPE: ACUTE PAIN
TYPE: ACUTE PAIN

## 2022-03-04 ASSESSMENT — PAIN DESCRIPTION - DESCRIPTORS
DESCRIPTORS: PRESSURE
DESCRIPTORS: PRESSURE

## 2022-03-04 NOTE — PROGRESS NOTES
Pt stable and able to be transferred from PACU to room 4467. A&O , VSS, with no complaints at this time. 4T called and notified that pt is being transferred out of PACU and to room.

## 2022-03-04 NOTE — CARE COORDINATION
Discharge Planning Note:    Chart reviewed and it appears that patient has minimal needs for discharge at this time. Discussed with patients nurse and requested that case management be notified if discharge needs are identified. Case management will continue to follow progress and update discharge plan as needed.     Risk of Readmission Score: 12%    ALEKSANDR Ramirez RN      Phone: [phone]

## 2022-03-04 NOTE — OP NOTE
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 LEFT SINUS CONTENTS Tissue Tissue SURGICAL PATHOLOGY [place] Chavez,  3/4/2022 1554        Implants:  * No implants in log *      Drains: * No LDAs found *    Findings: Bulky polypoid tissue filling most of the left maxillary sinus. Brisk bleeding noted from the left maxillary sinus polypoid contents. No evidence of obvious tumor in right nasal passage or along left nasal skull base. Small posterior nasal septal perforation. Detailed Description of Procedure:   Consent was confirmed in the pre-operative area after the discussion with the patient and all questions were answered. The patient was taken to the OR by the anesthesia team. She was placed under general anesthesia and intubated uneventfully by the anesthesia team. Time out was performed and agreed upon by the entirety of the operating room staff. The patient was prepped and draped in the clean and usual fashion. After this the left sided merocel packing was removed. The bilateral nasal cavities was inspected with the 0 and 30 degree nasal endoscope. There was noted to be bulky polypoid tissue along the maxillary antrum and filling the maxillary sinus.  Biopsies of this were taken with 90 degree Blakesley forceps and sent for permanent histopathologic analysis. After biopsy there was noted to be brisk bleeding in the left maxillary sinus. Attempted cauterization with suction bovie was attempted but unsuccessful in controlling this. An epinephrine soaked pledget was placed at the site of bleeding. Some of the left maxillary contents were then debrided with 60 degree sinus microdebrider which precipitated more bleeding. Given the bleeding decision was made to perform left sphenopalatine artery ligation. The medial maxillary wall mucosa was submucosally dissected posterior to the prior left maxillary antrostomy site. The ethmoidal crest was encounter and posterior to this the sphenopalatine artery was identified. Dissection around this artery was performed. The artery was cauterized with Bipolar cauterization and than transected with straight endoscopic scissors. After ligation cottonoid packing was removed from the left maxillary sinus demonstrating hemostasis. Hemopore nasal packing was then placed within the left maxillary sinus. After control of bleeding the bilateral nasal passages were inspected with the 0 and 30 degree nasal endoscope with findings as noted above. This completed the procedure and the patient was turned back to the care of the anesthesia team for awakening and extubation. She was transported to the PACU in stable condition after being uneventfully extubated.        Electronically signed by Graciela Higginbotham DO on 3/4/2022 at 4:26 PM

## 2022-03-04 NOTE — PROGRESS NOTES
[facility] PROGRESS NOTE    3/4/2022 2:06 PM        Name: Mayelin Robert . Admitted: 3/3/2022  Primary Care Provider: Nacho Delong (Tel: [phone])        Subjective:      Bed having some mild bleeding chest pain shortness fevers or    Reviewed interval ancillary notes    Current Medications  sodium chloride flush 0.9 % injection 5-40 mL, 2 times per day  sodium chloride flush 0.9 % injection 5-40 mL, PRN  0.9 % sodium chloride infusion, PRN  HYDROmorphone (DILAUDID) injection 0.25 mg, Q5 Min PRN  HYDROmorphone (DILAUDID) injection 0.5 mg, Q5 Min PRN  ondansetron (ZOFRAN) injection 4 mg, Once PRN  0.9 % sodium chloride infusion, Continuous  levothyroxine (SYNTHROID) tablet 75 mcg, Daily  pravastatin (PRAVACHOL) tablet 40 mg, Nightly  sodium chloride flush 0.9 % injection 5-40 mL, 2 times per day  sodium chloride flush 0.9 % injection 5-40 mL, PRN  0.9 % sodium chloride infusion, PRN  ondansetron (ZOFRAN-ODT) disintegrating tablet 4 mg, Q8H PRN   Or  ondansetron (ZOFRAN) injection 4 mg, Q6H PRN  polyethylene glycol (GLYCOLAX) packet 17 g, Daily PRN  acetaminophen (TYLENOL) tablet 650 mg, Q6H PRN   Or  acetaminophen (TYLENOL) suppository 650 mg, Q6H PRN  ceFAZolin (ANCEF) 2000 mg in dextrose 5 % 50 mL IVPB, Q12H  pantoprazole (PROTONIX) tablet 40 mg, QAM AC        Objective:  /78   Pulse 69   Temp 97.6 °F (36.4 °C) (Temporal)   Resp 16   Ht 5' 4\" (1.626 m)   Wt 133 lb (60.3 kg)   SpO2 95%   BMI 22.83 kg/m²   No intake or output data in the 24 hours ending 03/04/22 1406   Wt Readings from Last 3 Encounters:   03/03/22 133 lb (60.3 kg)   04/09/21 146 lb 3.2 oz (66.3 kg)   06/27/17 146 lb (66.2 kg)       General appearance:  Appears comfortable.  AAOx3  HEENT: atraumatic, Pupils equal, muscous membranes moist, no masses appreciated  Left nasal packing  Cardiovascular: Regular rate and rhythm no murmurs appreciated  Respiratory: CTAB no wheezing  Gastrointestinal: Abdomen soft, non-tender, BS+  EXT: no edema  Neurology: no gross focal deficts  Psychiatry: Appropriate affect. Not agitated  Skin: Warm, dry, no rashes appreciated    Labs and Tests:  CBC:   Recent Labs     03/03/22  1334 03/04/22  0808   WBC 6.5 5.0   HGB 13.6 11.6*    165     BMP:    Recent Labs     03/03/22  1333 03/04/22  0808    140   K 4.4 3.9    107   CO2 25 22   BUN 23* 20   CREATININE 1.0 1.1   GLUCOSE 93 92     Hepatic:   Recent Labs     03/03/22  1333   AST 26   ALT 15   BILITOT 0.4   ALKPHOS 80     CT SINUS FOR IMAGE GUIDANCE   Final Result   Extensive prior surgery is noted particular involving the nasal passage and   turbinates as well as ethmoid sinuses with a window created into the left   maxillary sinus as well as the sphenoid sinus. Complete opacification of the left maxillary sinus increased significantly   from prior disease. Hypoplastic right maxillary sinus peer             Recent imaging reviewed    Problem List  Principal Problem:    Epistaxis  Resolved Problems:    * No resolved hospital problems.  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

## 2022-03-04 NOTE — PLAN OF CARE
Problem: Falls - Risk of:  Goal: Will remain free from falls  Description: Will remain free from falls  Outcome: Ongoing  Goal: Absence of physical injury  Description: Absence of physical injury  Outcome: Ongoing     Problem: Pain:  Description: Pain management should include both nonpharmacologic and pharmacologic interventions.   Goal: Pain level will decrease  Description: Pain level will decrease  Outcome: Ongoing  Goal: Control of acute pain  Description: Control of acute pain  Outcome: Ongoing  Goal: Control of chronic pain  Description: Control of chronic pain  Outcome: Ongoing     Problem: Bleeding:  Goal: Will show no signs and symptoms of excessive bleeding  Description: Will show no signs and symptoms of excessive bleeding  Outcome: Ongoing     Problem: Daily Care:  Goal: Daily care needs are met  Description: Daily care needs are met  Outcome: Ongoing     Problem: Discharge Planning:  Goal: Patients continuum of care needs are met  Description: Patients continuum of care needs are met  Outcome: Ongoing

## 2022-03-04 NOTE — PROGRESS NOTES
Pt transferred to room 4467 at this time. A&O with no signs of distress. Report given to [facility]. V/u and denies questions or further needs at this time.     at bedside updated on status

## 2022-03-04 NOTE — ANESTHESIA PRE PROCEDURE
Department of Anesthesiology  Preprocedure Note       Name:  Gerson Shah   Age:  81 y.o.  :  1940                                          MRN:  [id number]         Date:  3/4/2022      Surgeon: Corinthia Goodpasture):  Viola Willard DO    Procedure: Procedure(s):  NASAL ENDOSCOPY CONTROL OF EPISTAXIS    Medications prior to admission:   Prior to Admission medications    Medication Sig Start Date End Date Taking? Authorizing Provider   omeprazole (PRILOSEC) 20 MG delayed release capsule Take 20 mg by mouth daily   Yes Historical Provider, MD   pravastatin (PRAVACHOL) 40 MG tablet TAKE 1 TABLET BY MOUTH NIGHTLY. 3/28/16  Yes Leoncio Mejias MD   levothyroxine (LEVOTHROID) 75 MCG tablet Take 1 tablet by mouth daily 16  Yes Leoncio Mejias MD   Biotin 1000 MCG TABS Take  by mouth. Yes Historical Provider, MD   therapeutic multivitamin-minerals (THERAGRAN-M) tablet Take 1 tablet by mouth daily. Yes Historical Provider, MD   aspirin 81 MG EC tablet Take 81 mg by mouth daily. Yes Historical Provider, MD   azelastine (ASTELIN) 0.1 % nasal spray 2 sprays by Nasal route 2 times daily Use in each nostril as directed 12/7/15   Leoncio Mejias MD   fluticasone [place]) 50 MCG/ACT nasal spray 2 sprays by Nasal route daily 7/22/15   Jose Sánchez MD   calcium carbonate (OSCAL) 500 MG TABS tablet Take 500 mg by mouth daily.       Historical Provider, MD       Current medications:    Current Facility-Administered Medications   Medication Dose Route Frequency Provider Last Rate Last Admin    0.9 % sodium chloride infusion  1,000 mL IntraVENous Continuous LINDA Moreau   Stopped at 22    levothyroxine (SYNTHROID) tablet 75 mcg  75 mcg Oral Daily Sharon Villavicencio MD   75 mcg at 22 184    pravastatin (PRAVACHOL) tablet 40 mg  40 mg Oral Nightly Sharon Villavicencio MD   40 mg at 22    sodium chloride flush 0.9 % injection 5-40 mL  5-40 mL IntraVENous 2 times per day Sharon Villavicencio MD   10 mL at 03/04/22 0815    sodium chloride flush 0.9 % injection 5-40 mL  5-40 mL IntraVENous PRN Mike Hameed MD        0.9 % sodium chloride infusion  25 mL IntraVENous PRN Mike Hameed  mL/hr at 03/04/22 1340 NoRateChange at 03/04/22 1340    ondansetron (ZOFRAN-ODT) disintegrating tablet 4 mg  4 mg Oral Q8H PRN Mike Hameed MD        Or    ondansetron (ZOFRAN) injection 4 mg  4 mg IntraVENous Q6H PRN Mike Hameed MD        polyethylene glycol (GLYCOLAX) packet 17 g  17 g Oral Daily PRN Mike Hameed MD        acetaminophen (TYLENOL) tablet 650 mg  650 mg Oral Q6H PRN Mike Hameed MD        Or    acetaminophen (TYLENOL) suppository 650 mg  650 mg Rectal Q6H PRN Mike Hameed MD        ceFAZolin (ANCEF) 2000 mg in dextrose 5 % 50 mL IVPB  2,000 mg IntraVENous Q12H Leonila Vleasquez DO   Stopped at 03/04/22 0623    pantoprazole (PROTONIX) tablet 40 mg  40 mg Oral QAM AC Mike Hameed MD   40 mg at 03/03/22 1842       Allergies:     Allergies   Allergen Reactions    Naprosyn [Naproxen]     Macrobid [Nitrofurantoin Monohydrate Macrocrystals] Rash       Problem List:    Patient Active Problem List   Diagnosis Code    Hypothyroidism E03.9    Pure hypercholesterolemia E78.00    GERD (gastroesophageal reflux disease) K21.9    Allergic sinusitis J30.9    Sinusitis, acute J01.90    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    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    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    Bilateral impacted cerumen H61.23    Epistaxis R04.0       Past Medical History:        Diagnosis Date    Cancer ([address]     sinuses and cheek right    Hyperlipidemia     Hypothyroidism     Nausea & vomiting        Past Surgical History:        Procedure Laterality Date    CHOLECYSTECTOMY      HYSTERECTOMY      OTHER SURGICAL HISTORY      chemo/radiation for sinus cancers    SINUS SURGERY      UPPER GASTROINTESTINAL ENDOSCOPY  7-17-13    with biopsy, dilatation, and endoscopic ultrasound       Social History:    Social History     Tobacco Use    Smoking status: Never Smoker    Smokeless tobacco: Never Used   Substance Use Topics    Alcohol use: No     Comment: rare                                Counseling given: Not Answered      Vital Signs (Current):   Vitals:    03/03/22 2000 03/03/22 2039 03/04/22 0730 03/04/22 1300   BP: 105/66  101/67 106/66   Pulse: 65  68 70   Resp: 16  16 16   Temp: 97.7 °F (36.5 °C)  98.5 °F (36.9 °C) 98.5 °F (36.9 °C)   TempSrc: Oral  Oral Oral   SpO2: 96% 94% 93% 95%   Weight:       Height:                                                  BP Readings from Last 3 Encounters:   03/04/22 106/66   03/03/22 123/78   02/14/22 132/83       NPO Status:                                                                                 BMI:   Wt Readings from Last 3 Encounters:   03/03/22 133 lb (60.3 kg)   04/09/21 146 lb 3.2 oz (66.3 kg)   06/27/17 146 lb (66.2 kg)     Body mass index is 22.83 kg/m². CBC:   Lab Results   Component Value Date    WBC 5.0 03/04/2022    RBC 3.85 03/04/2022    HGB 11.6 03/04/2022    HCT 34.5 03/04/2022    MCV 89.6 03/04/2022    RDW 14.2 03/04/2022     03/04/2022       CMP:   Lab Results   Component Value Date     03/04/2022    K 3.9 03/04/2022     03/04/2022    CO2 22 03/04/2022    BUN 20 03/04/2022    CREATININE 1.1 03/04/2022    GFRAA 58 03/04/2022    GFRAA 57 05/09/2012    AGRATIO 1.9 03/03/2022    LABGLOM 48 03/04/2022    GLUCOSE 92 03/04/2022    PROT 7.3 03/03/2022    PROT 7.0 05/09/2012    CALCIUM 9.0 03/04/2022    BILITOT 0.4 03/03/2022    ALKPHOS 91 03/03/2022    AST 26 03/03/2022    ALT 15 03/03/2022       POC Tests: No results for input(s): POCGLU, POCNA, POCK, POCCL, POCBUN, POCHEMO, POCHCT in the last 72 hours.     Coags:   Lab Results   Component Value Date    PROTIME 11.0 03/03/2022    INR 0.97 03/03/2022    APTT 40.9 03/03/2022       HCG (If Applicable): No results found for: PREGTESTUR, PREGSERUM, HCG, HCGQUANT     ABGs: No results found for: PHART, PO2ART, WFX2VUR, IJE5NCV, BEART, D4JSFEBZ     Type & Screen (If Applicable):  No results found for: LABABO, LABRH    Drug/Infectious Status (If Applicable):  No results found for: HIV, HEPCAB    COVID-19 Screening (If Applicable):   Lab Results   Component Value Date    COVID19 Not Detected 03/04/2022           Anesthesia Evaluation  Patient summary reviewed   history of anesthetic complications: PONV. Airway: Mallampati: II  TM distance: >3 FB   Neck ROM: full  Mouth opening: < 3 FB Dental: normal exam         Pulmonary:Negative Pulmonary ROS and normal exam  breath sounds clear to auscultation                             Cardiovascular:  Exercise tolerance: good (>4 METS),       (-) past MI, CAD and  angina    ECG reviewed  Rhythm: regular  Rate: normal                    Neuro/Psych:   Negative Neuro/Psych ROS              GI/Hepatic/Renal:   (+) GERD:,           Endo/Other:    (+) hypothyroidism::., .                 Abdominal:             Vascular: negative vascular ROS. Other Findings:           Anesthesia Plan      general     ASA 3       Induction: intravenous. MIPS: Postoperative opioids intended and Prophylactic antiemetics administered. Anesthetic plan and risks discussed with patient. Plan discussed with CRNA.     Attending anesthesiologist reviewed and agrees with Halima Randall MD   3/4/2022

## 2022-03-04 NOTE — ANESTHESIA POSTPROCEDURE EVALUATION
Department of Anesthesiology  Postprocedure Note    Patient: Telma Martell  MRN: [id number]  YOB: 1940  Date of evaluation: 3/4/2022  Time:  4:21 PM     Procedure Summary     Date: 03/04/22 Room / Location: [address]    Anesthesia Start: 9012 Anesthesia Stop: 4466    Procedure: NASAL ENDOSCOPY CONTROL OF EPISTAXIS, WITH BIOPSY, SPHENOPALATINE ARTERY LIGATION (N/A Head) Diagnosis:       (ACUTE POSTERIOR EPISTAXIS)      (HISTORY OF CANCER OF NASAL CAVITY)    Surgeons: Manuel Hurley DO Responsible Provider: Alva Andersen MD    Anesthesia Type: general ASA Status: 3          Anesthesia Type: general    Alfredito Phase I: Alfredito Score: 6    Alfredito Phase II:      Last vitals: Reviewed and per EMR flowsheets.        Anesthesia Post Evaluation    Patient location during evaluation: PACU  Patient participation: complete - patient participated  Level of consciousness: awake and alert  Airway patency: patent  Nausea & Vomiting: no vomiting and no nausea  Complications: no  Cardiovascular status: hemodynamically stable  Respiratory status: acceptable  Hydration status: stable

## 2022-03-04 NOTE — PROGRESS NOTES
81yoF with hx of esthesioneuroblastoma diagnosed 40 years ago s/p nasal endoscopy with biopsy of left maxillary sinus contents and endoscopic ligation of left SPA. Seen in PACU, doing well, no pain, no epistaxis. Plan:  - Transfer back to the floor. Plan to keep over the weekend to monitor for any bleeding recurrence. - Start nasal saline spray to bilateral nostrils  - No dietary restrictions  - PRN pain meds  - Await pathology results. Will eventually need MRI skull base/sinuses to evaluate for recurrence of esthesioneuroblastoma, will wait for pathology to result first. I will place this order when appropriate. - Please do not hesitate to call myself (cell: [phone]) or on call ENT with any questions or concerns.      Dr. Venessa Lombardo, [address] [place] [address]   Otolaryngology - Head & Neck [facility]

## 2022-03-04 NOTE — PLAN OF CARE
Problem: Falls - Risk of:  Goal: Will remain free from falls  Description: Will remain free from falls  3/4/2022 0915 by Daniele Galloway RN  Outcome: Ongoing  3/4/2022 0305 by Vanessa Romero RN  Outcome: Ongoing  Goal: Absence of physical injury  Description: Absence of physical injury  3/4/2022 0915 by Daniele Galloway RN  Outcome: Ongoing  3/4/2022 0305 by Vanessa Romero RN  Outcome: Ongoing     Problem: Pain:  Goal: Pain level will decrease  Description: Pain level will decrease  3/4/2022 0915 by Daniele Galloway RN  Outcome: Ongoing  3/4/2022 0305 by Vanessa Romero RN  Outcome: Ongoing  Goal: Control of acute pain  Description: Control of acute pain  3/4/2022 0915 by Daniele Galloway RN  Outcome: Ongoing  3/4/2022 0305 by Vanessa Romero RN  Outcome: Ongoing  Goal: Control of chronic pain  Description: Control of chronic pain  3/4/2022 0915 by Daniele Galloway RN  Outcome: Ongoing  3/4/2022 0305 by Vanessa Romero RN  Outcome: Ongoing     Problem: Bleeding:  Goal: Will show no signs and symptoms of excessive bleeding  Description: Will show no signs and symptoms of excessive bleeding  3/4/2022 0915 by Daniele Galloway RN  Outcome: Ongoing  3/4/2022 0305 by Vanessa Romero RN  Outcome: Ongoing     Problem: Daily Care:  Goal: Daily care needs are met  Description: Daily care needs are met  3/4/2022 0915 by Daniele Galloway RN  Outcome: Ongoing  3/4/2022 0305 by Vanessa Romero RN  Outcome: Ongoing     Problem: Discharge Planning:  Goal: Patients continuum of care needs are met  Description: Patients continuum of care needs are met  3/4/2022 0915 by Daniele Galloway RN  Outcome: Ongoing  3/4/2022 0305 by Vanessa Romero RN  Outcome: Ongoing

## 2022-03-05 VITALS
HEART RATE: 61 BPM | RESPIRATION RATE: 13 BRPM | TEMPERATURE: 98.7 F | SYSTOLIC BLOOD PRESSURE: 94 MMHG | OXYGEN SATURATION: 93 % | DIASTOLIC BLOOD PRESSURE: 59 MMHG | BODY MASS INDEX: 22.71 KG/M2 | WEIGHT: 133 LBS | HEIGHT: 64 IN

## 2022-03-05 LAB
BASOPHILS ABSOLUTE: 0 K/UL (ref 0–0.2)
BASOPHILS RELATIVE PERCENT: 0.5 %
EOSINOPHILS ABSOLUTE: 0 K/UL (ref 0–0.6)
EOSINOPHILS RELATIVE PERCENT: 0 %
HCT VFR BLD CALC: 33.2 % (ref 36–48)
HEMOGLOBIN: 11 G/DL (ref 12–16)
LYMPHOCYTES ABSOLUTE: 1.1 K/UL (ref 1–5.1)
LYMPHOCYTES RELATIVE PERCENT: 11.9 %
MCH RBC QN AUTO: 29.9 PG (ref 26–34)
MCHC RBC AUTO-ENTMCNC: 33.3 G/DL (ref 31–36)
MCV RBC AUTO: 89.9 FL (ref 80–100)
MONOCYTES ABSOLUTE: 0.8 K/UL (ref 0–1.3)
MONOCYTES RELATIVE PERCENT: 8.9 %
NEUTROPHILS ABSOLUTE: 7 K/UL (ref 1.7–7.7)
NEUTROPHILS RELATIVE PERCENT: 78.7 %
PDW BLD-RTO: 14.4 % (ref 12.4–15.4)
PLATELET # BLD: 195 K/UL (ref 135–450)
PMV BLD AUTO: 8.3 FL (ref 5–10.5)
RBC # BLD: 3.69 M/UL (ref 4–5.2)
WBC # BLD: 8.9 K/UL (ref 4–11)

## 2022-03-05 PROCEDURE — 85025 COMPLETE CBC W/AUTO DIFF WBC: CPT

## 2022-03-05 PROCEDURE — 36415 COLL VENOUS BLD VENIPUNCTURE: CPT

## 2022-03-05 PROCEDURE — 6360000002 HC RX W HCPCS: Performed by: STUDENT IN AN ORGANIZED HEALTH CARE EDUCATION/TRAINING PROGRAM

## 2022-03-05 PROCEDURE — 6370000000 HC RX 637 (ALT 250 FOR IP): Performed by: INTERNAL MEDICINE

## 2022-03-05 PROCEDURE — 99232 SBSQ HOSP IP/OBS MODERATE 35: CPT | Performed by: STUDENT IN AN ORGANIZED HEALTH CARE EDUCATION/TRAINING PROGRAM

## 2022-03-05 RX ORDER — CEPHALEXIN 500 MG/1
500 CAPSULE ORAL 2 TIMES DAILY
Qty: 10 CAPSULE | Refills: 0 | Status: SHIPPED | OUTPATIENT
Start: 2022-03-05 | End: 2022-03-10

## 2022-03-05 RX ORDER — ECHINACEA PURPUREA EXTRACT 125 MG
2 TABLET ORAL
Qty: 1 EACH | Refills: 5 | Status: SHIPPED | OUTPATIENT
Start: 2022-03-05

## 2022-03-05 RX ADMIN — PANTOPRAZOLE SODIUM 40 MG: 40 TABLET, DELAYED RELEASE ORAL at 05:59

## 2022-03-05 RX ADMIN — LEVOTHYROXINE SODIUM 75 MCG: 0.07 TABLET ORAL at 08:01

## 2022-03-05 RX ADMIN — CEFAZOLIN 2000 MG: 10 INJECTION, POWDER, FOR SOLUTION INTRAVENOUS at 05:59

## 2022-03-05 ASSESSMENT — PAIN SCALES - GENERAL: PAINLEVEL_OUTOF10: 0

## 2022-03-05 NOTE — PLAN OF CARE
Problem: Falls - Risk of:  Goal: Will remain free from falls  Description: Will remain free from falls  3/4/2022 2259 by Osman Rollins RN  Outcome: Met This Shift  3/4/2022 0915 by Neela Saavedra RN  Outcome: Ongoing  Goal: Absence of physical injury  Description: Absence of physical injury  3/4/2022 2259 by Osman Rollins RN  Outcome: Met This Shift  3/4/2022 0915 by Neela Saavedra RN  Outcome: Ongoing     Problem: Pain:  Goal: Pain level will decrease  Description: Pain level will decrease  3/4/2022 2259 by Osman Rollins RN  Outcome: Met This Shift  3/4/2022 0915 by Neela Saavedra RN  Outcome: Ongoing  Goal: Control of acute pain  Description: Control of acute pain  3/4/2022 2259 by Osman Rollins RN  Outcome: Met This Shift  3/4/2022 0915 by Neela Saavedra RN  Outcome: Ongoing  Goal: Control of chronic pain  Description: Control of chronic pain  3/4/2022 2259 by Osman Rollins RN  Outcome: Met This Shift  3/4/2022 0915 by Neela Saavedra RN  Outcome: Ongoing     Problem: Bleeding:  Goal: Will show no signs and symptoms of excessive bleeding  Description: Will show no signs and symptoms of excessive bleeding  3/4/2022 2259 by Osman Rollins RN  Outcome: Met This Shift  3/4/2022 0915 by Neela Saavedra RN  Outcome: Ongoing     Problem: Daily Care:  Goal: Daily care needs are met  Description: Daily care needs are met  3/4/2022 2259 by Osman Rollins RN  Outcome: Met This Shift  3/4/2022 0915 by Neela Saavedra RN  Outcome: Ongoing     Problem: Discharge Planning:  Goal: Patients continuum of care needs are met  Description: Patients continuum of care needs are met  3/4/2022 0915 by Neela Saavedra RN  Outcome: Ongoing

## 2022-03-05 NOTE — PROGRESS NOTES
7:14 AM  Morning assessment complete. Patient denies any nose bleeding over night. Assisted patient to the bathroom and back to bed. Patient back in bed with bed alarm on and call light in reach. The care plan and education has been reviewed and mutually agreed upon with the patient. 1:26 PM  Went over discharge instructions with patient. Patient verbalized understanding and denies any questions. IV removed without any difficulty.

## 2022-03-05 NOTE — DISCHARGE SUMMARY
Hospital Medicine Discharge Summary    Patient: Yvan Kim     Gender: female  : 1940   Age: 81 y.o. MRN: [id number]    Admitting Physician: Aracelis Andrews MD  Discharge Physician: Aracelis Andrews MD     Code Status: Full Code     Admit Date: 3/3/2022   Discharge Date:   3/5/22    Disposition:  Home  Time spent arranging discharge: 35 minutes    Discharge Diagnoses: Active Hospital Problems    Diagnosis Date Noted    Epistaxis [R04.0] 2022   Acute posterior epitaxis with hx of nasal cavity cancer    Condition at Discharge:  Stable    Hospital Course:   Admit to hospital acute epistaxis to the OR with ENT had biopsy performed had no further bleeding discharged on Keflex by ENT will follow up with him in the office as outpatient    Discharge Exam:    BP (!) 94/59   Pulse 61   Temp 98.7 °F (37.1 °C) (Oral)   Resp 13   Ht 5' 4\" (1.626 m)   Wt 133 lb (60.3 kg)   SpO2 93%   BMI 22.83 kg/m²   General appearance:  Appears comfortable. AAOx3  HEENT: atraumatic, Pupils equal, muscous membranes moist, no masses appreciated  Cardiovascular: Regular rate and rhythm no murmurs appreciated  Respiratory: CTAB no wheezing  Gastrointestinal: Abdomen soft, non-tender, BS+  EXT: no edema  Neurology: no gross focal deficts  Psychiatry: Appropriate affect.  Not agitated  Skin: Warm, dry, no rashes appreciated    Discharge Medications:   Current Discharge Medication List      START taking these medications    Details   sodium chloride (OCEAN) 0.65 % nasal spray 2 sprays by Nasal route 5 times daily  Qty: 1 each, Refills: 5      cephALEXin (KEFLEX) 500 MG capsule Take 1 capsule by mouth 2 times daily for 5 days  Qty: 10 capsule, Refills: 0           Current Discharge Medication List        Current Discharge Medication List      CONTINUE these medications which have NOT CHANGED    Details   omeprazole (PRILOSEC) 20 MG delayed release capsule Take 20 mg by mouth daily      pravastatin (PRAVACHOL) 40 MG HISTORY: ORDERING SYSTEM PROVIDED HISTORY: epistaxis, hx of esthesioneuroblastoma TECHNOLOGIST PROVIDED HISTORY: Reason for exam:->epistaxis, hx of esthesioneuroblastoma Decision Support Exception - unselect if not a suspected or confirmed emergency medical condition->Emergency Medical Condition (MA) Reason for Exam: epistaxis, hx of esthesioneuroblastoma FINDINGS: SINUSES/MASTOIDS: There is total opacification of the left maxillary sinus. An antral window is noted unchanged. The degree of opacification has significantly increased from the prior study. 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 This is unchanged. Wide resection is seen throughout the ethmoid sinus mid and lower aspect as well as nasal terminates. The upper remaining ethmoid sinus air cells have moderate opacification. The frontal sinuses are clear and unchanged. There is mild sphenoid sinus mucosal disease as well with a bony defect from prior surgery. Compared to the prior study the ethmoid sinuses and sphenoid sinus disease are unchanged. The maxillary, sphenoid, ethmoid and frontal sinuses are clear. The bilateral ostiomeatal units are patent. Ethmoid roofs are symmetric. The mastoid air cells are well aerated. SOFT TISSUES:  Visualized soft tissues demonstrate no acute abnormality. The visualized portion of the intracranial contents demonstrate no gross acute abnormality. Extensive prior surgery is noted particular involving the nasal passage and turbinates as well as ethmoid sinuses with a window created into the left maxillary sinus as well as the sphenoid sinus. Complete opacification of the left maxillary sinus increased significantly from prior disease.  Hypoplastic right maxillary sinus peer         Signed:    Chantal House MD   3/5/2022

## 2022-03-05 NOTE — PROGRESS NOTES
[facility]  HEAD AND NECK - ENT  PROGRESS  NOTE    Date of Service: 3/5/2022  Date of Surgery: 3/4/22    ASSESSMENT: Orlin Gowers is a 81 y.o. female with hx of esthesioneuroblastoma diagnosed 40 years ago s/p nasal endoscopy with biopsy of left maxillary sinus contents and endoscopic ligation of left SPA on 3/5/2022. PLAN/RECOMMENDATIONS:     1. Acute posterior epistaxis  2. History of cancer of nasal cavity  -Doing markedly well postoperatively and given control of epistaxis would be suitable for discharge today. She can follow-up with me in the office in 1 week for follow-up. At that time we will review pathology results and consider contrasted MRI skull base and sinuses. -I have taken liberty of sending order for Keflex x5-day as well as nasal saline sprays 5 times daily to the pharmacy upon discharge  -Okay to restart baby aspirin    SUBJECTIVE:   No significant events overnight. No epistaxis overnight. She feels great overall. No pain or nasal discharge. OBJECTIVE:  Physical Exam:   Temp (24hrs), Av.7 °F (35.9 °C), Min:95.5 °F (35.3 °C), Max:98.7 °F (37.1 °C)    Vitals:    22 2342 22 0242 22 0703   BP: 120/76 106/71 103/64 (!) 94/57   Pulse: 64 63 63 68   Resp: 16 13 14 13   Temp: 98.2 °F (36.8 °C) 97.6 °F (36.4 °C) 98.3 °F (36.8 °C) 98.7 °F (37.1 °C)   TempSrc: Oral Oral Oral Oral   SpO2: 100% 96% 98% 92%   Weight:       Height:         I/O last 3 completed shifts: In: 1500 [P.O.:550;  I.V.:950]  Out: 770 [Urine:760; Blood:10]    Review of systems  CONSTITUTIONAL: no weight loss, no fever, no night sweats, no chills  EYES: no vision changes, no blurry vision  EARS: no changes in hearing, no otalgia  NOSE: +epistaxis, no rhinorrhea  RESPIRATORY: no difficulty breathing, no shortness of breath  CV: no chest pain, no lower extremity swelling  HEME: no coagulation disorder, no known bleeding disorders  NEURO: no TIA or stroke-like symptoms  SKIN: no new rashes in the head and neck, no recently diagnosed skin cancers  MOUTH: no new oral ulcers, no recent tooth infections  GASTROINTESTINAL: no diarrhea, no stomach pain  PSYCH: no anxiety, no depression    Physical exam  GENERAL: No Acute Distress, Alert and Oriented, no Hoarseness  EYES: EOMI, Anti-icteric  NOSE: No epistaxis, bilateral nasal passages patent. EARS: Normal external appearance; no otorrhea.   FACE: 1/6 House-Brackmann Scale, symmetric, sensation equal bilaterally  ORAL CAVITY: No masses or lesions palpated, uvula is midline, no evidence of active bleeding or blood clots in oropharynx  NECK: Normal range of motion, no thyromegaly, trachea is midline, no lymphadenopathy, no neck masses, no crepitus  CHEST: Normal respiratory effort, no retractions, breathing comfortably on room air  SKIN: No rashes, normal appearing skin, no evidence of skin lesions/tumors  NEURO: CN 2, 3, 4, 5, 6, 7, 11, 12 intact bilaterally     Lab Studies:  Lab Results   Component Value Date    WBC 5.0 03/04/2022    HGB 11.6 (L) 03/04/2022    HCT 34.5 (L) 03/04/2022    MCV 89.6 03/04/2022     03/04/2022     Lab Results   Component Value Date    GLUCOSE 92 03/04/2022    BUN 20 03/04/2022    CREATININE 1.1 03/04/2022    K 3.9 03/04/2022     03/04/2022     03/04/2022    CALCIUM 9.0 03/04/2022     No results found for: MG  Lab Results   Component Value Date    PHOS 3.4 06/27/2019     Lab Results   Component Value Date    ALKPHOS 91 03/03/2022    ALT 15 03/03/2022    AST 26 03/03/2022    BILITOT 0.4 03/03/2022    PROT 7.3 03/03/2022

## 2022-03-05 NOTE — PROGRESS NOTES
Shift assessment complete. Vital signs stable. SpO2 >90% on 2L. Will titrate as tolerated. A&O x4. No nasal bleeding at this time. Denies pain, discomfort, SOB or CP. Ambulated in room and returned to bed. The care plan and education has been reviewed and mutually agreed upon with the patient. Fall precautions in place and call light within reach.

## 2022-03-07 ENCOUNTER — CARE COORDINATION (OUTPATIENT)
Dept: CASE MANAGEMENT | Age: 82
End: 2022-03-07

## 2022-03-07 DIAGNOSIS — J30.9 ALLERGIC SINUSITIS: Primary | ICD-10-CM

## 2022-03-07 PROCEDURE — 1111F DSCHRG MED/CURRENT MED MERGE: CPT | Performed by: INTERNAL MEDICINE

## 2022-03-07 NOTE — CARE COORDINATION
[facility] Transitions Initial Follow Up Call    Call within 2 business days of discharge: Yes    Patient: Cliff Olsen Patient : 1940   MRN: [id number]  Reason for Admission: Epistaxis  Discharge Date: 3/5/22 RARS: Readmission Risk Score: 9.8 ( )      Last Discharge [facility]       Complaint Diagnosis Description Type Department Provider    3/3/22 Epistaxis Epistaxis ED to Hosp-Admission (Discharged) (ADMITTED) Yeimi Robledo MD; Tang Cash. .. Spoke with: [address] [place] [address] Way: [facility]    Non-face-to-face services provided:  Obtained and reviewed discharge summary and/or continuity of care documents   Transitions of Care Initial Call    Was this an external facility discharge? No Discharge Facility: [facility]    Challenges to be reviewed by the provider   Additional needs identified to be addressed with provider: No and No  none             Method of communication with provider : none      Advance Care Planning:   Does patient have an Advance Directive: Not on file. Was this a readmission? No  Patient stated reason for admission: Epistaxis  Patients top risk factors for readmission: ineffective coping, multiple health system providers  Care Transition Nurse (CTN) contacted the patient by telephone to perform post hospital discharge assessment. Verified name and  with patient as identifiers. Provided introduction to self, and explanation of the CTN role. CTN reviewed discharge instructions, medical action plan and red flags with patient who verbalized understanding. Patient given an opportunity to ask questions and does not have any further questions or concerns at this time. Were discharge instructions available to patient? Yes. Reviewed appropriate site of care based on symptoms and resources available to patient including: PCP, Specialist, Urgent care clinics and When to call 911.  The patient agrees to contact the PCP office for questions related to their healthcare. Medication reconciliation was performed with patient, who verbalizes understanding of administration of home medications. Advised obtaining a 90-day supply of all daily and as-needed medications. Covid Risk Education     Educated patient about risk for severe COVID-19 due to risk factors according to CDC guidelines. LPN CC reviewed discharge instructions, medical action plan and red flag symptoms with the patient who verbalized understanding. Discussed COVID vaccination status: Yes. Education provided on COVID-19 vaccination as appropriate. Discussed exposure protocols and quarantine with CDC Guidelines. Patient was given an opportunity to verbalize any questions and concerns and agrees to contact LPN CC or health care provider for questions related to their healthcare. Reviewed and educated patient on any new and changed medications related to discharge diagnosis. Was patient discharged with a pulse oximeter? No Discussed and confirmed pulse oximeter discharge instructions and when to notify provider or seek emergency care. LPN CC provided contact information. No further follow-up call indicated based on severity of symptoms and risk factors. Plan for next call: N/A     This [facility] spoke with pt and pt stated that she is doing well. Patient denied any worsening symptoms. Denied fever, chills, N/V and any difficulty breathing at this time. Denied chest pain and SOB. Denied difficulty with urination, BMs or appetite. Medication review performed and patient verbalized understanding and is taking all medications as prescribed. Pt will schedule f/u with Non [facility] PCP. Denied any needs and concerns at this time. Advised pt to immediately report any worsening symptoms to the PCP. Patient verbalized understanding and agreed.   Jose De Jesus Howell LPN, [facility]  PH: [phone]              Care Transitions 24 Hour Call    Schedule Follow Up Appointment with PCP: Declined  Do you have any ongoing symptoms?: No  Do you have a copy of your discharge instructions?: Yes  Do you have all of your prescriptions and are they filled?: Yes  Have you been contacted by a [facility] [address]?: No  Have you scheduled your follow up appointment?: No  Were you discharged with any Home Care or Post Acute Services: No  Do you feel like you have everything you need to keep you well at home?: Yes  Care Transitions Interventions  No Identified Needs         Follow Up  No future appointments.     Rachana Auguste LPN

## 2022-03-15 ENCOUNTER — OFFICE VISIT (OUTPATIENT)
Dept: ENT CLINIC | Age: 82
End: 2022-03-15
Payer: MEDICARE

## 2022-03-15 VITALS — HEART RATE: 77 BPM | TEMPERATURE: 97.1 F | DIASTOLIC BLOOD PRESSURE: 79 MMHG | SYSTOLIC BLOOD PRESSURE: 119 MMHG

## 2022-03-15 DIAGNOSIS — Z85.22 HISTORY OF CANCER OF NASAL CAVITY: ICD-10-CM

## 2022-03-15 DIAGNOSIS — R04.0 ACUTE POSTERIOR EPISTAXIS: Primary | ICD-10-CM

## 2022-03-15 PROCEDURE — G8484 FLU IMMUNIZE NO ADMIN: HCPCS | Performed by: STUDENT IN AN ORGANIZED HEALTH CARE EDUCATION/TRAINING PROGRAM

## 2022-03-15 PROCEDURE — 4040F PNEUMOC VAC/ADMIN/RCVD: CPT | Performed by: STUDENT IN AN ORGANIZED HEALTH CARE EDUCATION/TRAINING PROGRAM

## 2022-03-15 PROCEDURE — 1036F TOBACCO NON-USER: CPT | Performed by: STUDENT IN AN ORGANIZED HEALTH CARE EDUCATION/TRAINING PROGRAM

## 2022-03-15 PROCEDURE — 1123F ACP DISCUSS/DSCN MKR DOCD: CPT | Performed by: STUDENT IN AN ORGANIZED HEALTH CARE EDUCATION/TRAINING PROGRAM

## 2022-03-15 PROCEDURE — 1111F DSCHRG MED/CURRENT MED MERGE: CPT | Performed by: STUDENT IN AN ORGANIZED HEALTH CARE EDUCATION/TRAINING PROGRAM

## 2022-03-15 PROCEDURE — 31231 NASAL ENDOSCOPY DX: CPT | Performed by: STUDENT IN AN ORGANIZED HEALTH CARE EDUCATION/TRAINING PROGRAM

## 2022-03-15 PROCEDURE — 99213 OFFICE O/P EST LOW 20 MIN: CPT | Performed by: STUDENT IN AN ORGANIZED HEALTH CARE EDUCATION/TRAINING PROGRAM

## 2022-03-15 PROCEDURE — G8399 PT W/DXA RESULTS DOCUMENT: HCPCS | Performed by: STUDENT IN AN ORGANIZED HEALTH CARE EDUCATION/TRAINING PROGRAM

## 2022-03-15 PROCEDURE — G8427 DOCREV CUR MEDS BY ELIG CLIN: HCPCS | Performed by: STUDENT IN AN ORGANIZED HEALTH CARE EDUCATION/TRAINING PROGRAM

## 2022-03-15 PROCEDURE — 1090F PRES/ABSN URINE INCON ASSESS: CPT | Performed by: STUDENT IN AN ORGANIZED HEALTH CARE EDUCATION/TRAINING PROGRAM

## 2022-03-15 PROCEDURE — G8420 CALC BMI NORM PARAMETERS: HCPCS | Performed by: STUDENT IN AN ORGANIZED HEALTH CARE EDUCATION/TRAINING PROGRAM

## 2022-03-15 NOTE — PROGRESS NOTES
[facility] FOLLOW-UP VISIT      Patient Name: Yvan Kim  Medical Record Number:  [id number]  Primary Care Physician:  Keli Cody    ChiefComplaint     Chief Complaint   Patient presents with    Follow-up     not so good the antibiotic made her sick , nose just drips , blood going into the throat feels sore       History of Present Illness     Yvan Kim is an 81 y.o. female with a history of esthesioneuroblastoma previously seen for left acute posterior epistaxis status post nasal endoscopy with left sphenopalatine artery ligation on 3/4/2022. Interval History: It is stopped taking the antibiotic because it made her sick and nauseous. Her nose gets a lot of watery nasal discharge. She will cough up a little bit of dark blood clots at times, no episodes of heavy bleeding since surgery. Having some difficulty sleeping, taking Tylenol PM to help sleep which seems to help. Nasal pain significantly improved.     Past Medical History     Past Medical History:   Diagnosis Date    Cancer ([address]     sinuses and cheek right    Hyperlipidemia     Hypothyroidism     Nausea & vomiting        Past Surgical History     Past Surgical History:   Procedure Laterality Date    CHOLECYSTECTOMY      HYSTERECTOMY      OTHER SURGICAL HISTORY      chemo/radiation for sinus cancers    SINUS ENDOSCOPY N/A 3/4/2022    NASAL ENDOSCOPY CONTROL OF EPISTAXIS, WITH BIOPSY, SPHENOPALATINE ARTERY LIGATION performed by Gale Overton DO at [address] [place] [address] ENDOSCOPY  7-17-13    with biopsy, dilatation, and endoscopic ultrasound       Family History     Family History   Problem Relation Age of Onset    Cancer Mother     Cancer Father     Cancer Brother        Social History     Social History     Tobacco Use    Smoking status: Never Smoker    Smokeless tobacco: Never Used   Vaping Use    Vaping Use: Never used Substance Use Topics    Alcohol use: No     Comment: rare    Drug use: Not on file        Allergies     Allergies   Allergen Reactions    Naprosyn [Naproxen]     Macrobid [Nitrofurantoin Monohydrate Macrocrystals] Rash       Medications     Current Outpatient Medications   Medication Sig Dispense Refill    sodium chloride (OCEAN) 0.65 % nasal spray 2 sprays by Nasal route 5 times daily 1 each 5    omeprazole (PRILOSEC) 20 MG delayed release capsule Take 20 mg by mouth daily      pravastatin (PRAVACHOL) 40 MG tablet TAKE 1 TABLET BY MOUTH NIGHTLY. 90 tablet 0    levothyroxine (LEVOTHROID) 75 MCG tablet Take 1 tablet by mouth daily 90 tablet 3    Biotin 1000 MCG TABS Take  by mouth.  therapeutic multivitamin-minerals (THERAGRAN-M) tablet Take 1 tablet by mouth daily.  calcium carbonate (OSCAL) 500 MG TABS tablet Take 500 mg by mouth daily.  aspirin 81 MG EC tablet Take 81 mg by mouth daily. No current facility-administered medications for this visit. Review of Systems     REVIEW OF SYSTEMS  The following systems were reviewed and revealed the following in addition to any already discussed in the HPI:    CONSTITUTIONAL: no weight loss, no fever, no night sweats, no chills  EYES: no vision changes, no blurry vision  EARS: no hearing loss, no otalgia  NOSE: +epistaxis, no rhinorrhea  THROAT: No voice changes, no sore throat, no dysphagia    PhysicalExam     Vitals:    03/15/22 1505   BP: 119/79   Site: Left Upper Arm   Position: Sitting   Cuff Size: Medium Adult   Pulse: 77   Temp: 97.1 °F (36.2 °C)   TempSrc: Temporal       PHYSICAL EXAM  /79 (Site: Left Upper Arm, Position: Sitting, Cuff Size: Medium Adult)   Pulse 77   Temp 97.1 °F (36.2 °C) (Temporal)     GENERAL: No acute distress, alert and oriented. EYES: EOMI, Anti-icteric  NOSE: On anterior rhinoscopy there is no active epistaxis. See endoscopic procedure note below.   ORAL CAVITY: No masses or lesions visualized or palpated, uvula is midline, moist mucous membranes, no active bleeding or dried blood or blood clots in oropharynx    I have performed a head and neck physical exam personally or was physically present during the key or critical portions of the service. Procedure     Nasal Endoscopy, Diagnostic (85645)    Pre-op: Left epistaxis, history of cancer nasal cavity  Post-op: Same  Procedure: Nasal endoscopy    After obtaining verbal consent from the patient 2% lidocaine with afrin was sprayed into the nasal cavities. After allowing a time for anesthesia, a 0-degree rigid nasal endoscope was used to examine the nasal septum, turbinates, and mucosal tissue in the left nasal passage. There were no complications. Pertinent findings include: There is mild mucous drainage surrounding the hemopore nasal pack that was placed within the left maxillary sinus. This drainage was debrided with a #8 Slovak suction as well as the disintegrating portions of the hemopore nasal packing. Intact packing left in place. Posterior to this packing cauterization site of sphenopalatine artery appears to be healing appropriately with no active bleeding. *The patient tolerated the procedure well without complication. I attest that I was present for and did the entire procedure myself.       Pathology     [facility]                                        [address] [place], [place] [address]                                        Fax [phone]   [phone]   Department of Pathology   FINAL SURGICAL PATHOLOGY REPORT   Patient Name: Reed Khoury             Accession No:  [id number]    Age Sex:   1940    82 Y / F       Location:      [facility]    Account No:   [de-identified]                  Collected:     2022   Med Rec No:    [id number]                 Received:      2022   Attend Phys:   Joie Alexander MD         Completed:     2022 Perform Phys: Mary Abernathy DO     FINAL DIAGNOSIS:        A. Left maxillary antrum, biopsy:        - Acutely inflamed sinonasal mucosa        - No evidence of dysplasia or malignancy        B. Left maxillary sinus, biopsy:        - Acutely inflamed sinonasal mucosa        - No evidence of dysplasia or malignancy        C. 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 Assessment and Plan     1. Acute posterior epistaxis  2. History of cancer of nasal cavity  -Increase nasal saline sprays at least 5 times a day on her left nasal passage to promote disintegration nothing for packing  -She will follow up in 2 weeks for reevaluation. If all packing is dissolved at that time will consider getting MRI of the brainstem and sinuses to rule out recurrent disease. Follow-Up     Return in about 2 weeks (around 3/29/2022). Dr. Mt Schultz [place] [address]  Department of Otolaryngology/Head and Neck Surgery  3/15/22    Medical Decision Making: The following items were considered in medical decision making:  Independent review of images  Review / order clinical lab tests  Review / order radiology tests  Decision to obtain old records      This note was generated completely or in part utilizing Dragon dictation speech recognition software. Occasionally, words are mistranscribed and despite editing, the text may contain inaccuracies due to incorrect word recognition. If further clarification is needed please contact the office at [phone].

## 2022-03-22 ENCOUNTER — TELEPHONE (OUTPATIENT)
Dept: ENT CLINIC | Age: 82
End: 2022-03-22

## 2022-03-22 NOTE — TELEPHONE ENCOUNTER
Pt.would like to come in possibly on Thursday 3/24/22 she is having a hard time breathing through her nose.      Last visit 3/15/22  Next 4/5/22

## 2022-03-23 ENCOUNTER — TELEPHONE (OUTPATIENT)
Dept: ENT CLINIC | Age: 82
End: 2022-03-23

## 2022-03-23 NOTE — TELEPHONE ENCOUNTER
Pt.states she would like to come in tomorrow she can not breathe and some stuff is coming out her mouth she feels terrible. She would like a call back.     [phone]    Last visit 3/4 surgery     Next 4/5

## 2022-03-24 NOTE — TELEPHONE ENCOUNTER
Called patient to schedule her and she has to call back.  I told her she can be seen at 2:00 or 3:00

## 2022-03-28 NOTE — TELEPHONE ENCOUNTER
Called patient and LVM for her to call the office if her symptoms are still existing.  Hopefully we can get her a sooner appt

## 2022-04-05 ENCOUNTER — OFFICE VISIT (OUTPATIENT)
Dept: ENT CLINIC | Age: 82
End: 2022-04-05
Payer: MEDICARE

## 2022-04-05 VITALS — TEMPERATURE: 96.6 F | SYSTOLIC BLOOD PRESSURE: 102 MMHG | DIASTOLIC BLOOD PRESSURE: 71 MMHG | HEART RATE: 71 BPM

## 2022-04-05 DIAGNOSIS — J30.0 VASOMOTOR RHINITIS: ICD-10-CM

## 2022-04-05 DIAGNOSIS — Z85.22 HISTORY OF CANCER OF NASAL CAVITY: Primary | ICD-10-CM

## 2022-04-05 DIAGNOSIS — R04.0 ACUTE POSTERIOR EPISTAXIS: ICD-10-CM

## 2022-04-05 PROCEDURE — 1036F TOBACCO NON-USER: CPT | Performed by: STUDENT IN AN ORGANIZED HEALTH CARE EDUCATION/TRAINING PROGRAM

## 2022-04-05 PROCEDURE — 1090F PRES/ABSN URINE INCON ASSESS: CPT | Performed by: STUDENT IN AN ORGANIZED HEALTH CARE EDUCATION/TRAINING PROGRAM

## 2022-04-05 PROCEDURE — 31231 NASAL ENDOSCOPY DX: CPT | Performed by: STUDENT IN AN ORGANIZED HEALTH CARE EDUCATION/TRAINING PROGRAM

## 2022-04-05 PROCEDURE — G8420 CALC BMI NORM PARAMETERS: HCPCS | Performed by: STUDENT IN AN ORGANIZED HEALTH CARE EDUCATION/TRAINING PROGRAM

## 2022-04-05 PROCEDURE — 4040F PNEUMOC VAC/ADMIN/RCVD: CPT | Performed by: STUDENT IN AN ORGANIZED HEALTH CARE EDUCATION/TRAINING PROGRAM

## 2022-04-05 PROCEDURE — 1123F ACP DISCUSS/DSCN MKR DOCD: CPT | Performed by: STUDENT IN AN ORGANIZED HEALTH CARE EDUCATION/TRAINING PROGRAM

## 2022-04-05 PROCEDURE — G8399 PT W/DXA RESULTS DOCUMENT: HCPCS | Performed by: STUDENT IN AN ORGANIZED HEALTH CARE EDUCATION/TRAINING PROGRAM

## 2022-04-05 PROCEDURE — 99214 OFFICE O/P EST MOD 30 MIN: CPT | Performed by: STUDENT IN AN ORGANIZED HEALTH CARE EDUCATION/TRAINING PROGRAM

## 2022-04-05 PROCEDURE — G8427 DOCREV CUR MEDS BY ELIG CLIN: HCPCS | Performed by: STUDENT IN AN ORGANIZED HEALTH CARE EDUCATION/TRAINING PROGRAM

## 2022-04-05 RX ORDER — IPRATROPIUM BROMIDE 42 UG/1
2 SPRAY, METERED NASAL 3 TIMES DAILY PRN
Qty: 1 EACH | Refills: 3 | Status: SHIPPED | OUTPATIENT
Start: 2022-04-05

## 2022-04-05 NOTE — PROGRESS NOTES
[address] VISIT      Patient Name: Anna Samayoa  Medical Record Number:  [id number]  Primary Care Physician:  Rashmi Ramos    ChiefComplaint     Chief Complaint   Patient presents with    Follow-up     no new concerns , no nose bleeds but nose keep dripping       History of Present Illness     Anna Samayoa is an 81 y.o. female with a history of esthesioneuroblastoma previously seen for left acute posterior epistaxis status post nasal endoscopy with left sphenopalatine artery ligation on 3/4/2022. Interval History:   Recently she has had having a lot of watery drainage from her left nostril. No headaches. No nasal sprays use other than saline. No ritchie epistaxis.     Past Medical History     Past Medical History:   Diagnosis Date    Cancer ([address]     sinuses and cheek right    Hyperlipidemia     Hypothyroidism     Nausea & vomiting        Past Surgical History     Past Surgical History:   Procedure Laterality Date    CHOLECYSTECTOMY      HYSTERECTOMY      OTHER SURGICAL HISTORY      chemo/radiation for sinus cancers    SINUS ENDOSCOPY N/A 3/4/2022    NASAL ENDOSCOPY CONTROL OF EPISTAXIS, WITH BIOPSY, SPHENOPALATINE ARTERY LIGATION performed by Severo Keita DO at [address] [place] [address] UPPER GASTROINTESTINAL ENDOSCOPY  7-17-13    with biopsy, dilatation, and endoscopic ultrasound       Family History     Family History   Problem Relation Age of Onset    Cancer Mother     Cancer Father     Cancer Brother        Social History     Social History     Tobacco Use    Smoking status: Never Smoker    Smokeless tobacco: Never Used   Vaping Use    Vaping Use: Never used   Substance Use Topics    Alcohol use: No     Comment: rare    Drug use: Not on file        Allergies     Allergies   Allergen Reactions    Naprosyn [Naproxen]     Macrobid [Nitrofurantoin Monohydrate Macrocrystals] Rash       Medications Current Outpatient Medications   Medication Sig Dispense Refill    ipratropium (ATROVENT) 0.06 % nasal spray 2 sprays by Each Nostril route 3 times daily as needed for Rhinitis ((runny nose)) 1 each 3    sodium chloride (OCEAN) 0.65 % nasal spray 2 sprays by Nasal route 5 times daily 1 each 5    omeprazole (PRILOSEC) 20 MG delayed release capsule Take 20 mg by mouth daily      pravastatin (PRAVACHOL) 40 MG tablet TAKE 1 TABLET BY MOUTH NIGHTLY. 90 tablet 0    levothyroxine (LEVOTHROID) 75 MCG tablet Take 1 tablet by mouth daily 90 tablet 3    Biotin 1000 MCG TABS Take  by mouth.  therapeutic multivitamin-minerals (THERAGRAN-M) tablet Take 1 tablet by mouth daily.  calcium carbonate (OSCAL) 500 MG TABS tablet Take 500 mg by mouth daily.  aspirin 81 MG EC tablet Take 81 mg by mouth daily. No current facility-administered medications for this visit. Review of Systems     REVIEW OF SYSTEMS  The following systems were reviewed and revealed the following in addition to any already discussed in the HPI:    CONSTITUTIONAL: no weight loss, no fever, no night sweats, no chills  EYES: no vision changes, no blurry vision  EARS: no hearing loss, no otalgia  NOSE: no epistaxis, +rhinorrhea  THROAT: No voice changes, no sore throat, no dysphagia    PhysicalExam     Vitals:    04/05/22 1401   BP: 102/71   Site: Left Upper Arm   Position: Sitting   Cuff Size: Large Adult   Pulse: 71   Temp: 96.6 °F (35.9 °C)   TempSrc: Temporal       PHYSICAL EXAM  /71 (Site: Left Upper Arm, Position: Sitting, Cuff Size: Large Adult)   Pulse 71   Temp 96.6 °F (35.9 °C) (Temporal)     GENERAL: No acute distress, alert and oriented, no hoarseness  EYES: EOMI, Anti-icteric  NOSE: On anterior rhinoscopy there is no epistaxis, nasal mucosa moist and normal appearing, no purulent drainage. See endoscopic procedure note below.   EARS: Normal external appearance; on portable otomicroscopy:     -Ad: External auditory canal without stenosis, tympanic membrane clear, no middle ear effusions or retractions     -As: External auditory canal without stenosis, tympanic membrane clear, no middle ear effusions or retractions  FACE: HB 1/6 bilaterally, symmetric appearing, sensation equal bilaterally  ORAL CAVITY: No masses or lesions visualized or palpated, uvula is midline, moist mucous membranes, no blood in the oropharynx. NECK: Normal range of motion, no thyromegaly, trachea is midline, no palpable lymphadenopathy or neck masses, no crepitus  CHEST: Normal respiratory effort, breathing comfortably, no retractions  SKIN: No rashes, normal appearing skin, no evidence of skin lesions/tumors  NEURO: Cranial Nerves 2, 3, 4, 5, 6, 7, 11, 12 intact bilaterally     I have performed a head and neck physical exam personally or was physically present during the key or critical portions of the service. Procedure     Left Nasal Endoscopy, Diagnostic (43446)    Pre-op: History of cancer of nasal cavity, acute posterior epistaxis  Post-op: Same  Procedure: Nasal endoscopy    A 30-degree rigid nasal endoscope was used to examine the nasal septum, turbinates, and mucosal tissue in the left nasal passage. The middle meatus and sphenoethmoid recess was examined bilaterally. There were no complications. Pertinent findings include: There is small amount of dark-colored crusting along the left sphenopalatine artery ligation site. Within the left maxillary sinus there is dense polypoid tissue without any further remnants of hemopore nasal packing. No active epistaxis. *The patient tolerated the procedure well without complication. I attest that I was present for and did the entire procedure myself. Assessment and Plan     1. History of cancer of nasal cavity  -No evidence of recurrence on examination. She does have polypoid degeneration within the left maxillary sinus which was biopsied and noted to be benign.     2. Acute posterior epistaxis  -Start sinonasal saline irrigations for moisturization and to facilitate further healing of her surgical site    3. Vasomotor rhinitis  - ipratropium (ATROVENT) 0.06 % nasal spray; 2 sprays by Each Nostril route 3 times daily as needed for Rhinitis ((runny nose))  Dispense: 1 each; Refill: 3      Follow-Up     Return if symptoms worsen or fail to improve. Dr. [place] [address]  Department of Otolaryngology/Head and Neck Surgery  4/7/22    Medical Decision Making: The following items were considered in medical decision making:  Independent review of images  Review / order clinical lab tests  Review / order radiology tests  Decision to obtain old records      This note was generated completely or in part utilizing Dragon dictation speech recognition software. Occasionally, words are mistranscribed and despite editing, the text may contain inaccuracies due to incorrect word recognition. If further clarification is needed please contact the office at [phone].

## 2022-08-19 ENCOUNTER — OFFICE VISIT (OUTPATIENT)
Dept: ENT CLINIC | Age: 82
End: 2022-08-19
Payer: MEDICARE

## 2022-08-19 VITALS — HEART RATE: 63 BPM | SYSTOLIC BLOOD PRESSURE: 118 MMHG | TEMPERATURE: 97.2 F | DIASTOLIC BLOOD PRESSURE: 74 MMHG

## 2022-08-19 DIAGNOSIS — J30.0 VASOMOTOR RHINITIS: ICD-10-CM

## 2022-08-19 DIAGNOSIS — R13.10 DYSPHAGIA, UNSPECIFIED TYPE: Primary | ICD-10-CM

## 2022-08-19 DIAGNOSIS — K21.9 LARYNGOPHARYNGEAL REFLUX (LPR): ICD-10-CM

## 2022-08-19 PROCEDURE — 1123F ACP DISCUSS/DSCN MKR DOCD: CPT | Performed by: STUDENT IN AN ORGANIZED HEALTH CARE EDUCATION/TRAINING PROGRAM

## 2022-08-19 PROCEDURE — 1090F PRES/ABSN URINE INCON ASSESS: CPT | Performed by: STUDENT IN AN ORGANIZED HEALTH CARE EDUCATION/TRAINING PROGRAM

## 2022-08-19 PROCEDURE — 99213 OFFICE O/P EST LOW 20 MIN: CPT | Performed by: STUDENT IN AN ORGANIZED HEALTH CARE EDUCATION/TRAINING PROGRAM

## 2022-08-19 PROCEDURE — G8420 CALC BMI NORM PARAMETERS: HCPCS | Performed by: STUDENT IN AN ORGANIZED HEALTH CARE EDUCATION/TRAINING PROGRAM

## 2022-08-19 PROCEDURE — 31575 DIAGNOSTIC LARYNGOSCOPY: CPT | Performed by: STUDENT IN AN ORGANIZED HEALTH CARE EDUCATION/TRAINING PROGRAM

## 2022-08-19 PROCEDURE — G8399 PT W/DXA RESULTS DOCUMENT: HCPCS | Performed by: STUDENT IN AN ORGANIZED HEALTH CARE EDUCATION/TRAINING PROGRAM

## 2022-08-19 PROCEDURE — 1036F TOBACCO NON-USER: CPT | Performed by: STUDENT IN AN ORGANIZED HEALTH CARE EDUCATION/TRAINING PROGRAM

## 2022-08-19 PROCEDURE — G8427 DOCREV CUR MEDS BY ELIG CLIN: HCPCS | Performed by: STUDENT IN AN ORGANIZED HEALTH CARE EDUCATION/TRAINING PROGRAM

## 2022-08-19 RX ORDER — OMEPRAZOLE 20 MG/1
20 CAPSULE, DELAYED RELEASE ORAL
Qty: 180 CAPSULE | Refills: 1 | Status: SHIPPED | OUTPATIENT
Start: 2022-08-19

## 2022-08-19 NOTE — PROGRESS NOTES
[address] VISIT      Patient Name: Kade Jackson Record Number:  [id number]  Primary Care Physician:  Leon Duckworth    ChiefComplaint     Chief Complaint   Patient presents with    Other     Trouble swallowing, pain in throat for reflux        History of Present Illness     Lincoln Salazar is an 81 y.o. female previously seen for history of nasal cavity cancer, acute posterior epistaxis, vasomotor rhinitis. Interval History:   Chronic swallowing issues, seeming to get worse recently. Worse with dry foods such as bread or crackers, will need to quickly wash down with water after eating these types of foods. No recent upper endoscopy. Has heartburn getting worse, will burn into the roof of her mouth. Taking prilosec once a day in the morning. Unable to lie down flat because it makes her heartburn flareup. Nose drips continuously since surgery her SPA ligation. No recent GI evaluation.      Past Medical History     Past Medical History:   Diagnosis Date    Cancer ([address]     sinuses and cheek right    Hyperlipidemia     Hypothyroidism     Nausea & vomiting        Past Surgical History     Past Surgical History:   Procedure Laterality Date    CHOLECYSTECTOMY      HYSTERECTOMY (CERVIX STATUS UNKNOWN)      OTHER SURGICAL HISTORY      chemo/radiation for sinus cancers    SINUS ENDOSCOPY N/A 3/4/2022    NASAL ENDOSCOPY CONTROL OF EPISTAXIS, WITH BIOPSY, SPHENOPALATINE ARTERY LIGATION performed by Shanice Marcelo DO at [place] [address] ENDOSCOPY  7-17-13    with biopsy, dilatation, and endoscopic ultrasound       Family History     Family History   Problem Relation Age of Onset    Cancer Mother     Cancer Father     Cancer Brother        Social History     Social History     Tobacco Use    Smoking status: Never    Smokeless tobacco: Never   Vaping Use    Vaping Use: Never used   Substance Use Topics Alcohol use: No     Comment: rare        Allergies     Allergies   Allergen Reactions    Naprosyn [Naproxen]     Macrobid [Nitrofurantoin Monohydrate Macrocrystals] Rash       Medications     Current Outpatient Medications   Medication Sig Dispense Refill    omeprazole (PRILOSEC) 20 MG delayed release capsule Take 1 capsule by mouth 2 times daily (before meals) 180 capsule 1    ipratropium (ATROVENT) 0.06 % nasal spray 2 sprays by Each Nostril route 3 times daily as needed for Rhinitis ((runny nose)) 1 each 3    sodium chloride (OCEAN) 0.65 % nasal spray 2 sprays by Nasal route 5 times daily 1 each 5    pravastatin (PRAVACHOL) 40 MG tablet TAKE 1 TABLET BY MOUTH NIGHTLY. 90 tablet 0    levothyroxine (LEVOTHROID) 75 MCG tablet Take 1 tablet by mouth daily 90 tablet 3    Biotin 1000 MCG TABS Take  by mouth. therapeutic multivitamin-minerals (THERAGRAN-M) tablet Take 1 tablet by mouth daily. calcium carbonate (OSCAL) 500 MG TABS tablet Take 500 mg by mouth daily. aspirin 81 MG EC tablet Take 81 mg by mouth daily. (Patient not taking: Reported on 8/19/2022)       No current facility-administered medications for this visit. Review of Systems     REVIEW OF SYSTEM: See HPI above    PhysicalExam     Vitals:    08/19/22 1134   BP: 118/74   Pulse: 63   Temp: 97.2 °F (36.2 °C)   TempSrc: Temporal       PHYSICAL EXAM  /74   Pulse 63   Temp 97.2 °F (36.2 °C) (Temporal)     GENERAL: No acute distress, alert and oriented. EYES: EOMI, Anti-icteric. NOSE: Direness around nasal vestibule on the left. On anterior rhinoscopy there is no epistaxis, nasal mucosa moist and normal appearing, no purulent drainage.    EARS: Normal external appearance; on portable otomicroscopy:     -Ad: External auditory canal without stenosis, tympanic membrane clear, no middle ear effusions or retractions     -As: External auditory canal without stenosis, tympanic membrane clear, no middle ear effusions or retractions  FACE: HB 1/6 bilaterally, symmetric appearing, sensation equal bilaterally  ORAL CAVITY: No masses or lesions visualized or palpated, uvula is midline, moist mucous membranes, no oropharyngeal masses or oropharyngeal obstruction  NECK: Normal range of motion, no thyromegaly, trachea is midline, no palpable lymphadenopathy or neck masses, no crepitus  CHEST: Normal respiratory effort, breathing comfortably, no retractions  SKIN: No rashes, normal appearing skin, no evidence of skin lesions/tumors  NEURO: Cranial Nerves 2, 3, 4, 5, 6, 7, 11, 12 grossly intact bilaterally     I have performed a head and neck physical exam personally or was physically present during the key or critical portions of the service. Procedure     Procedure: Flexible Laryngoscopy    Pre-op: Dysphagia  Post-op: Same  Procedure : Flexible Nasopharyngolaryngoscopy  Surgeon: Dr. Allan Alvarado DO  Anesthesia: Afrin with 2% lidocaine  Estimated Blood Loss: None    Description of Procedure:  After obtaining consent, the patient was placed in the examination chair in the upright position. Decongestant and topical anesthetic was sprayed in the bilateral nares and after allowing adequate time for hemostatic effect, the flexible 4 mm laryngoscope was passed via the left nasal passage. Nasal Septum: No acute septal deviation, no septal hematoma or perforations noted   Nasal Findings: Small amount of mucus crusting in the posterior sphenoethmoidal region. The area of prior cauterization and ligation healing appropriately. Nasal polypoid changes within the left maxillary sinus.   Nasopharynx: Clear, no masses or inflammation  Oropharynx: Bilateral tonsillar tissue, no exophytic masses  Base of Tongue: Lingual tonsils within normal limits, vallecula without effacement  Epiglottis: Upright, in normal anatomical position  True Vocal Cord: Anatomically normal, no masses or inflammation, normal abduction and adduction upon inspiration and

## 2022-08-22 ENCOUNTER — TELEPHONE (OUTPATIENT)
Dept: ENT CLINIC | Age: 82
End: 2022-08-22

## 2022-09-29 RX ORDER — FOLIC ACID 0.8 MG
TABLET ORAL
COMMUNITY

## 2022-09-29 RX ORDER — AMOXICILLIN 500 MG
CAPSULE ORAL
COMMUNITY

## 2022-09-29 RX ORDER — CALCIUM POLYCARBOPHIL 625 MG 625 MG/1
1250 TABLET ORAL NIGHTLY
COMMUNITY

## 2022-09-29 NOTE — PROGRESS NOTES
Patient _X__ reached   _____not reached-preop instructions left on voice hlcy___[phone]__________      DATE___10/10/22_____ TIME_1030_______ARRIVAL__0900  FEC_______      Nothing to eat or drink after midnight the night before,except for what the prep instructions call for. If you do not have the instructions or do not understand them please contact your doctors office. Follow any instructions your doctors office has given you including what medications to take the AM of your procedure and which ones to hold. You may use your inhalers. If you take a long acting insulin the jane prior please cut the dose in half and take no diabetic medications that AM.Follow specific doctors office instructions regarding blood thinners and if they want you to hold and for how long. If you are on a blood thinner and have no instructions please contact the office and ask. Dress comfortably,bring your insurance card,picture ID,and a complete list of medications, including supplements. You must have a responsible adult to stay with you during the procedure,drive you home and stay with you. [facility] phone number [phone] for any questions. OTHER INSTRUCTIONS(if applicable)___takes levothyroxine am of proedure______________________________________________________        VISITOR POLICY(subject to change)    Current visitor policy is 2 visitors per patient. No children allowed. Mask are required. Visiting hours are 8a-8p. Overnight visitors will be at the discretion of the nurse.

## 2022-10-10 ENCOUNTER — ANESTHESIA (OUTPATIENT)
Dept: ENDOSCOPY | Age: 82
End: 2022-10-10
Payer: MEDICARE

## 2022-10-10 ENCOUNTER — ANESTHESIA EVENT (OUTPATIENT)
Dept: ENDOSCOPY | Age: 82
End: 2022-10-10
Payer: MEDICARE

## 2022-10-10 ENCOUNTER — HOSPITAL ENCOUNTER (OUTPATIENT)
Age: 82
Setting detail: OUTPATIENT SURGERY
Discharge: HOME | End: 2022-10-10
Attending: INTERNAL MEDICINE | Admitting: INTERNAL MEDICINE
Payer: MEDICARE

## 2022-10-10 VITALS
OXYGEN SATURATION: 96 % | TEMPERATURE: 97.3 F | HEART RATE: 57 BPM | WEIGHT: 130 LBS | DIASTOLIC BLOOD PRESSURE: 74 MMHG | SYSTOLIC BLOOD PRESSURE: 102 MMHG | BODY MASS INDEX: 23.04 KG/M2 | RESPIRATION RATE: 16 BRPM | HEIGHT: 63 IN

## 2022-10-10 DIAGNOSIS — K21.9 GASTROESOPHAGEAL REFLUX DISEASE, UNSPECIFIED WHETHER ESOPHAGITIS PRESENT: ICD-10-CM

## 2022-10-10 PROCEDURE — 2500000003 HC RX 250 WO HCPCS: Performed by: NURSE ANESTHETIST, CERTIFIED REGISTERED

## 2022-10-10 PROCEDURE — 3609012400 HC EGD TRANSORAL BIOPSY SINGLE/MULTIPLE: Performed by: INTERNAL MEDICINE

## 2022-10-10 PROCEDURE — 88305 TISSUE EXAM BY PATHOLOGIST: CPT

## 2022-10-10 PROCEDURE — 7100000011 HC PHASE II RECOVERY - ADDTL 15 MIN: Performed by: INTERNAL MEDICINE

## 2022-10-10 PROCEDURE — 2580000003 HC RX 258: Performed by: ANESTHESIOLOGY

## 2022-10-10 PROCEDURE — 2709999900 HC NON-CHARGEABLE SUPPLY: Performed by: INTERNAL MEDICINE

## 2022-10-10 PROCEDURE — 6360000002 HC RX W HCPCS: Performed by: NURSE ANESTHETIST, CERTIFIED REGISTERED

## 2022-10-10 PROCEDURE — C1726 CATH, BAL DIL, NON-VASCULAR: HCPCS | Performed by: INTERNAL MEDICINE

## 2022-10-10 PROCEDURE — 3609017700 HC EGD DILATION GASTRIC/DUODENAL STRICTURE: Performed by: INTERNAL MEDICINE

## 2022-10-10 PROCEDURE — 3700000000 HC ANESTHESIA ATTENDED CARE: Performed by: INTERNAL MEDICINE

## 2022-10-10 PROCEDURE — 7100000010 HC PHASE II RECOVERY - FIRST 15 MIN: Performed by: INTERNAL MEDICINE

## 2022-10-10 RX ORDER — SODIUM CHLORIDE 9 MG/ML
INJECTION, SOLUTION INTRAVENOUS CONTINUOUS
Status: DISCONTINUED | OUTPATIENT
Start: 2022-10-10 | End: 2022-10-10 | Stop reason: HOSPADM

## 2022-10-10 RX ORDER — PROPOFOL 10 MG/ML
INJECTION, EMULSION INTRAVENOUS PRN
Status: DISCONTINUED | OUTPATIENT
Start: 2022-10-10 | End: 2022-10-10 | Stop reason: SDUPTHER

## 2022-10-10 RX ORDER — LIDOCAINE HYDROCHLORIDE 10 MG/ML
INJECTION, SOLUTION INFILTRATION; PERINEURAL PRN
Status: DISCONTINUED | OUTPATIENT
Start: 2022-10-10 | End: 2022-10-10 | Stop reason: SDUPTHER

## 2022-10-10 RX ADMIN — SODIUM CHLORIDE: 9 INJECTION, SOLUTION INTRAVENOUS at 10:44

## 2022-10-10 RX ADMIN — PROPOFOL 20 MG: 10 INJECTION, EMULSION INTRAVENOUS at 11:04

## 2022-10-10 RX ADMIN — PROPOFOL 50 MG: 10 INJECTION, EMULSION INTRAVENOUS at 11:00

## 2022-10-10 RX ADMIN — LIDOCAINE HYDROCHLORIDE 50 MG: 10 INJECTION, SOLUTION INFILTRATION; PERINEURAL at 11:00

## 2022-10-10 RX ADMIN — PROPOFOL 10 MG: 10 INJECTION, EMULSION INTRAVENOUS at 11:02

## 2022-10-10 ASSESSMENT — PAIN - FUNCTIONAL ASSESSMENT: PAIN_FUNCTIONAL_ASSESSMENT: NONE - DENIES PAIN

## 2022-10-10 NOTE — DISCHARGE INSTRUCTIONS
EGD DISCHARGE INSTRUCTIONS    Use salt water gargle, lozenges, or Chloraseptic spray as needed for sore throat. If you have fever, chills, excessive bleeding, severe chest pain, or abdominal pain, or any other problems, contact your physician's office immediately at [phone]. If you had an esophageal dilatation, and experience fever, chills, excessive bleeding, shortness of breath, chest or abdominal pain, or any other unusual symptom, call the office immediately at [phone]. Continue home medications as directed. Call physician's office in five to seven business days for biopsy results or further instructions. Call your physician at [phone] for a follow up appointment in ______________    You need another EGD in _______________________________    See your physician's report for procedure details and recommendations. ANESTHESIA DISCHARGE INSTRUCTIONS    Wear your seatbelt home. A responsible adult needs to be with you for 24 hours  You are under the influence of drugs-do not drink alcohol, drive ,operate machinery,or make any important decisions or sign any legal documentsfor 24 hours. You may resume normal activities tomorrow. You may experience lightheadedness,dizziness,or sleepiness following surgery. Rest at home today - increase activity as tolerated. It is recommended to take a four hour nap after procedure. Progress slowly to a regular diet unless your physician has instructed you otherwise. Avoid spicy and greasy food on first meal.  Drink plenty of water. If nausea becomes a problem call your physician. Call your doctor if concerns arise.

## 2022-10-10 NOTE — ANESTHESIA PRE PROCEDURE
Department of Anesthesiology  Preprocedure Note       Name:  Alexander Davis   Age:  81 y.o.  :  1940                                          MRN:  [id number]         Date:  10/10/2022      Surgeon: Amna Hugo):  Kwame Holder MD    Procedure: Procedure(s):  EGD DIAGNOSTIC ONLY    Medications prior to admission:   Prior to Admission medications    Medication Sig Start Date End Date Taking? Authorizing Provider   Omega-3 Fatty Acids (FISH OIL) 1200 MG CAPS Take by mouth daily (with breakfast)    Historical Provider, MD   Magnesium 500 MG CAPS Take by mouth daily (with breakfast)    Historical Provider, MD   Apoaequorin (PREVAGEN PO) Take by mouth daily (with breakfast)    Historical Provider, MD   POTASSIUM PO Take 550 mg by mouth daily (with breakfast)    Historical Provider, MD   Multiple Vitamins-Minerals (PRESERVISION AREDS PO) Take by mouth daily (with breakfast)    Historical Provider, MD   polycarbophil (FIBERCON) 625 MG tablet Take 1,250 mg by mouth at bedtime    Historical Provider, MD   omeprazole (PRILOSEC) 20 MG delayed release capsule Take 1 capsule by mouth 2 times daily (before meals) 22   Javy Valdez DO   ipratropium (ATROVENT) 0.06 % nasal spray 2 sprays by Each Nostril route 3 times daily as needed for Rhinitis ((runny nose)) 22   Javy Valdez DO   sodium chloride (OCEAN) 0.65 % nasal spray 2 sprays by Nasal route 5 times daily  Patient taking differently: 2 sprays by Nasal route as needed 3/5/22   Javy Valdez DO   pravastatin (PRAVACHOL) 40 MG tablet TAKE 1 TABLET BY MOUTH NIGHTLY. 3/28/16   Wilman Link MD   levothyroxine (LEVOTHROID) 75 MCG tablet Take 1 tablet by mouth daily 16   Wilman Link MD   Biotin 1000 MCG TABS Take by mouth daily (with breakfast)    Historical Provider, MD   therapeutic multivitamin-minerals (THERAGRAN-M) tablet Take 1 tablet by mouth daily.       Historical Provider, MD       Current medications:    No current outpatient medications on file. No current facility-administered medications for this visit. 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    Bilateral impacted cerumen H61.23    Epistaxis R04.0       Past Medical History:        Diagnosis Date    Cancer ([facility] [address]     sinuses and cheek right    Hyperlipidemia     Hypothyroidism     Nausea & vomiting        Past Surgical History:        Procedure Laterality Date    CHOLECYSTECTOMY      HYSTERECTOMY (CERVIX STATUS UNKNOWN)      OTHER SURGICAL HISTORY      chemo/radiation for sinus cancers    SINUS ENDOSCOPY N/A 3/4/2022    NASAL ENDOSCOPY CONTROL OF EPISTAXIS, WITH BIOPSY, SPHENOPALATINE ARTERY LIGATION performed by Jimmy Scheuermann, DO at [facility] [place] [address] UPPER GASTROINTESTINAL ENDOSCOPY  7-17-13    with biopsy, dilatation, and endoscopic ultrasound       Social History:    Social History     Tobacco Use    Smoking status: Never    Smokeless tobacco: Never   Substance Use Topics    Alcohol use: No     Comment: rare                                Counseling given: Not Answered      Vital Signs (Current): There were no vitals filed for this visit.                                            BP Readings from Last 3 Encounters:   08/19/22 118/74   04/05/22 102/71   03/15/22 119/79       NPO Status:                                                                                 BMI:   Wt Readings from Last 3 Encounters:   09/29/22 130 lb (59 kg)   03/03/22 133 lb (60.3 kg)   04/09/21 146 lb 3.2 oz (66.3 kg)     There is no height or weight on file to calculate BMI.    CBC:   Lab Results   Component Value Date/Time    WBC 8.9 03/05/2022 11:03 AM    RBC 3.69 03/05/2022 11:03 AM    HGB 11.0 03/05/2022 11:03 AM    HCT 33.2 03/05/2022 11:03 AM    MCV 89.9 03/05/2022 11:03 AM    RDW 14.4 03/05/2022 11:03 AM     03/05/2022 11:03 AM       CMP:   Lab Results   Component Value Date/Time     03/04/2022 08:08 AM    K 3.9 03/04/2022 08:08 AM     03/04/2022 08:08 AM    CO2 22 03/04/2022 08:08 AM    BUN 20 03/04/2022 08:08 AM    CREATININE 1.1 03/04/2022 08:08 AM    GFRAA 58 03/04/2022 08:08 AM    GFRAA 57 05/09/2012 11:12 AM    AGRATIO 1.9 03/03/2022 01:33 PM    LABGLOM 48 03/04/2022 08:08 AM    GLUCOSE 92 03/04/2022 08:08 AM    PROT 7.3 03/03/2022 01:33 PM    PROT 7.0 05/09/2012 11:12 AM    CALCIUM 9.0 03/04/2022 08:08 AM    BILITOT 0.4 03/03/2022 01:33 PM    ALKPHOS 91 03/03/2022 01:33 PM    AST 26 03/03/2022 01:33 PM    ALT 15 03/03/2022 01:33 PM       POC Tests: No results for input(s): POCGLU, POCNA, POCK, POCCL, POCBUN, POCHEMO, POCHCT in the last 72 hours. Coags:   Lab Results   Component Value Date/Time    PROTIME 11.0 03/03/2022 01:34 PM    INR 0.97 03/03/2022 01:34 PM    APTT 40.9 03/03/2022 01:34 PM       HCG (If Applicable): No results found for: PREGTESTUR, PREGSERUM, HCG, HCGQUANT     ABGs: No results found for: PHART, PO2ART, UNZ6PMX, DSF2ESN, BEART, P5XQVHZK     Type & Screen (If Applicable):  No results found for: LABABO, LABRH    Drug/Infectious Status (If Applicable):  No results found for: HIV, HEPCAB    COVID-19 Screening (If Applicable):   Lab Results   Component Value Date/Time    COVID19 Not Detected 03/04/2022 12:00 PM           Anesthesia Evaluation  Patient summary reviewed   history of anesthetic complications: PONV.   Airway: Mallampati: II  TM distance: >3 FB   Neck ROM: full  Mouth opening: < 3 FB   Dental: normal exam         Pulmonary:Negative Pulmonary ROS and normal exam  breath sounds clear to auscultation                             Cardiovascular:  Exercise tolerance: good (>4 METS),       (-) past MI, CAD and  angina    ECG reviewed  Rhythm: regular  Rate: normal                    Neuro/Psych:   Negative Neuro/Psych ROS              GI/Hepatic/Renal:   (+) GERD:,           Endo/Other:    (+) hypothyroidism::., .                 Abdominal:             Vascular: negative vascular ROS. Other Findings:             Anesthesia Plan      MAC     ASA 3       Induction: intravenous. MIPS: Postoperative opioids intended and Prophylactic antiemetics administered. Anesthetic plan and risks discussed with patient. Plan discussed with CRNA.     Attending anesthesiologist reviewed and agrees with Rose Ma MD   10/10/2022

## 2022-10-10 NOTE — ANESTHESIA POSTPROCEDURE EVALUATION
Department of Anesthesiology  Postprocedure Note    Patient: Enrique Archuleta  MRN: [id number]  YOB: 1940  Date of evaluation: 10/10/2022      Procedure Summary     Date: 10/10/22 Room / Location: [address]    Anesthesia Start: 1058 Anesthesia Stop: 1112    Procedures:       EGD DILATION BALLOON (Abdomen)      EGD BIOPSY (Abdomen) Diagnosis:       Gastroesophageal reflux disease, unspecified whether esophagitis present      (Gastroesophageal reflux disease, unspecified whether esophagitis present [K21.9])    Surgeons: Jonathan Terrell MD Responsible Provider: Kai Rodriguez MD    Anesthesia Type: MAC ASA Status: 3          Anesthesia Type: No value filed.     Alfredito Phase I: Alfredito Score: 10    Alfredito Phase II:        Anesthesia Post Evaluation    Patient location during evaluation: PACU  Patient participation: complete - patient participated  Level of consciousness: awake  Airway patency: patent  Nausea & Vomiting: no vomiting and no nausea  Complications: no  Cardiovascular status: hemodynamically stable  Respiratory status: acceptable  Hydration status: stable  Multimodal analgesia pain management approach

## 2023-01-12 ENCOUNTER — OFFICE VISIT (OUTPATIENT)
Dept: ENT CLINIC | Age: 83
End: 2023-01-12

## 2023-01-12 VITALS
TEMPERATURE: 97.1 F | SYSTOLIC BLOOD PRESSURE: 103 MMHG | HEART RATE: 61 BPM | WEIGHT: 132 LBS | OXYGEN SATURATION: 93 % | BODY MASS INDEX: 23.39 KG/M2 | DIASTOLIC BLOOD PRESSURE: 69 MMHG | HEIGHT: 63 IN

## 2023-01-12 DIAGNOSIS — J34.89 NASAL CRUSTING: ICD-10-CM

## 2023-01-12 DIAGNOSIS — H61.21 IMPACTED CERUMEN OF RIGHT EAR: ICD-10-CM

## 2023-01-12 DIAGNOSIS — Z98.890 HISTORY OF SINUS SURGERY: ICD-10-CM

## 2023-01-12 DIAGNOSIS — J01.40 ACUTE PANSINUSITIS, RECURRENCE NOT SPECIFIED: Primary | ICD-10-CM

## 2023-01-12 RX ORDER — SULFAMETHOXAZOLE AND TRIMETHOPRIM 400; 80 MG/1; MG/1
1 TABLET ORAL 2 TIMES DAILY
Qty: 20 TABLET | Refills: 0 | Status: SHIPPED | OUTPATIENT
Start: 2023-01-12 | End: 2023-01-22

## 2023-01-12 NOTE — PROGRESS NOTES
[address] VISIT      Patient Name: Alejandro Falk  Medical Record Number:  [id number]  Primary Care Physician:  [id number]    ChiefComplaint     Chief Complaint   Patient presents with    Sinus Problem     Nasal congestion, cant breath through the nose        History of Present Illness     Alejandro Falk is an 81 y.o. female previously seen for history of nasal cavity cancer, acute posterior epistaxis, vasomotor rhinitis. Status post left SPA ligation on 3/4/2022. Interval History:   Nasal congestion x 1 week. Worse when lying down. Has been sleeping in a reclined position due to this.  + mucopurulent nasal discharge, Has pressure over forehead and pain inside of nose. Currently no sense of smell. No significant nosebleeds, just small amount of blood tinged secretions from nose. Mouth is dry from breathing through mouth. Using saline nasal spray as needed.     Past Medical History     Past Medical History:   Diagnosis Date    Cancer ([address]     sinuses and cheek right    GERD (gastroesophageal reflux disease)     Hyperlipidemia     Hypothyroidism     Nausea & vomiting        Past Surgical History     Past Surgical History:   Procedure Laterality Date    CHOLECYSTECTOMY      HYSTERECTOMY (CERVIX STATUS UNKNOWN)      OTHER SURGICAL HISTORY      chemo/radiation for sinus cancers    SINUS ENDOSCOPY N/A 3/4/2022    NASAL ENDOSCOPY CONTROL OF EPISTAXIS, WITH BIOPSY, SPHENOPALATINE ARTERY LIGATION performed by Peyton Antonio DO at [place] [address] ENDOSCOPY  7-17-13    with biopsy, dilatation, and endoscopic ultrasound    UPPER GASTROINTESTINAL ENDOSCOPY N/A 10/10/2022    EGD DILATION BALLOON performed by Sonal Hurst MD at [facility] N/A 10/10/2022    EGD BIOPSY performed by Sonal Hurst MD at [address] History     Family History Problem Relation Age of Onset    Cancer Mother     Cancer Father     Cancer Brother        Social History     Social History     Tobacco Use    Smoking status: Never    Smokeless tobacco: Never   Vaping Use    Vaping Use: Never used   Substance Use Topics    Alcohol use: No        Allergies     Allergies   Allergen Reactions    Naprosyn [Naproxen]     Macrobid [Nitrofurantoin Monohydrate Macrocrystals] Rash       Medications     Current Outpatient Medications   Medication Sig Dispense Refill    sulfamethoxazole-trimethoprim (BACTRIM) 400-80 MG per tablet Take 1 tablet by mouth 2 times daily for 10 days 20 tablet 0    Cranberry-Vitamin C-Vitamin E (CRANBERRY PLUS VITAMIN C PO) Take by mouth daily      Omega-3 Fatty Acids (FISH OIL) 1200 MG CAPS Take by mouth daily (with breakfast)      Magnesium 500 MG CAPS Take by mouth daily (with breakfast)      Apoaequorin (PREVAGEN PO) Take by mouth daily (with breakfast)      POTASSIUM PO Take 550 mg by mouth daily (with breakfast)      Multiple Vitamins-Minerals (PRESERVISION AREDS PO) Take by mouth daily (with breakfast)      polycarbophil (FIBERCON) 625 MG tablet Take 1,250 mg by mouth at bedtime      omeprazole (PRILOSEC) 20 MG delayed release capsule Take 1 capsule by mouth 2 times daily (before meals) 180 capsule 1    ipratropium (ATROVENT) 0.06 % nasal spray 2 sprays by Each Nostril route 3 times daily as needed for Rhinitis ((runny nose)) 1 each 3    sodium chloride (OCEAN) 0.65 % nasal spray 2 sprays by Nasal route 5 times daily (Patient taking differently: 2 sprays by Nasal route as needed) 1 each 5    pravastatin (PRAVACHOL) 40 MG tablet TAKE 1 TABLET BY MOUTH NIGHTLY. 90 tablet 0    levothyroxine (LEVOTHROID) 75 MCG tablet Take 1 tablet by mouth daily 90 tablet 3    Biotin 1000 MCG TABS Take by mouth daily (with breakfast)      therapeutic multivitamin-minerals (THERAGRAN-M) tablet Take 1 tablet by mouth daily.          No current facility-administered medications for this visit. Review of Systems     REVIEW OF SYSTEM: See HPI above    PhysicalExam     Vitals:    01/12/23 0807   BP: 103/69   Pulse: 61   Temp: 97.1 °F (36.2 °C)   TempSrc: Temporal   SpO2: 93%   Weight: 132 lb (59.9 kg)   Height: 5' 3\" (1.6 m)       PHYSICAL EXAM  /69   Pulse 61   Temp 97.1 °F (36.2 °C) (Temporal)   Ht 5' 3\" (1.6 m)   Wt 132 lb (59.9 kg)   SpO2 93%   BMI 23.38 kg/m²     GENERAL: No acute distress, alert and oriented. EYES: EOMI, Anti-icteric. NOSE: On anterior rhinoscopy there is no epistaxis, nasal mucosa moist and normal appearing, no purulent drainage. EARS: Normal external appearance; on portable otomicroscopy:     -Ad: External auditory canal with cerumen impaction, see procedure note below.     -As: External auditory canal without stenosis, tympanic membrane clear, no middle ear effusions or retractions  FACE: HB 1/6 bilaterally, symmetric appearing, sensation equal bilaterally  ORAL CAVITY: No masses or lesions visualized or palpated, uvula is midline, moist mucous membranes, no oropharyngeal masses or oropharyngeal obstruction  NECK: Normal range of motion, no thyromegaly, trachea is midline, no palpable lymphadenopathy or neck masses, no crepitus  CHEST: Normal respiratory effort, breathing comfortably, no retractions  SKIN: No rashes, normal appearing skin, no evidence of skin lesions/tumors  NEURO: Cranial Nerves 2, 3, 4, 5, 6, 7, 11, 12 grossly intact bilaterally     I have performed a head and neck physical exam personally or was physically present during the key or critical portions of the service.     Procedure     Procedure: Bilateral Nasal Endoscopy with Debridement (05841) (Modifiers 50)  Pre Op Dx: Nasal crusting, acute sinusitis, history of sinus surgery  Post Op Dx: Same  Procedure: Nasal endoscopy with debridement of the bilateral nasal cavities  Surgeon: Dr. Laurie Kee  Anesthesia: Local 2% lidocaine with afrin   Specimens: None  Findings: Bilateral nasal cavity with copious amounts of malodorous sinus crusting debris and underlying mucopurulent drainage. No evidence of epistaxis. There is some polypoid tissue within the left maxillary sinus. Small posterior nasal septal perforation with clean margins. Procedure:    After the application of afrin and lidocaine, the bilateral nasal sinus cavity was debrided utilizing a zero degree nasal endoscope for visualization, bayonet forceps, alligator forceps and #9 Serbian suction for instrumentation. The mucosa of the sinus lining was noted to be erythematous and edematous. See findings above. The patient tolerated the procedure well without issue. Complications: None  Blood loss: Minimal     Procedure: Binocular otomicroscopy with debridement of cerumen impaction    Pre-op: Cerumen impaction of the right external auditory canal   Post op: Same  Procedure : Binocular otomicroscopy with debridement of cerumen of right external auditory canal  Surgeon: Dr. Elvina Hodgkins, DO  Estimated Blood Loss: None    Description of Procedure:    After obtaining verbal consent, the patient was placed in the examination chair in the reclined position.      -Right ear: External auditory canal with occluding cerumen limiting visualization of the tympanic membrane which was removed with use of #7 and #5 Setswana suction, alligator forceps, and cerumen loop curet. After successful removal of cerumen, the right tympanic membrane was visualized and without significant retractions or cholesteatoma, no middle ear effusions. * Patient tolerated the procedure well with no complications    Assessment and Plan     1. Acute pansinusitis, recurrence not specified  2. Nasal crusting  3. History of sinus surgery  - Significant mount of malodorous nasal crusting and mucopurulence noted within the bilateral nasal passages which was debrided under endoscopy today.   - Bactrim x10 days  - Start nasal saline irrigations twice daily    4. Impacted cerumen of right ear  - Cerumen debrided in the office today  - Avoid Q-tip and self instrumentation of ears  - Hydrogen peroxide or Debrox (OTC) drops as needed or once every other week to help break up and soften wax  - Follow-up as needed for repeat debridement    Follow-Up     Return if symptoms worsen or fail to improve. Dr. [place] [address]  Department of Otolaryngology/Head and Neck Surgery  1/13/23    Medical Decision Making: The following items were considered in medical decision making:  Independent review of images  Review / order clinical lab tests  Review / order radiology tests  Decision to obtain old records      This note was generated completely or in part utilizing Dragon dictation speech recognition software. Occasionally, words are mistranscribed and despite editing, the text may contain inaccuracies due to incorrect word recognition. If further clarification is needed please contact the office at [phone].

## 2023-03-13 ENCOUNTER — OFFICE VISIT (OUTPATIENT)
Dept: ENT CLINIC | Age: 83
End: 2023-03-13
Payer: MEDICARE

## 2023-03-13 VITALS
DIASTOLIC BLOOD PRESSURE: 77 MMHG | HEART RATE: 63 BPM | HEIGHT: 63 IN | OXYGEN SATURATION: 96 % | TEMPERATURE: 97.4 F | SYSTOLIC BLOOD PRESSURE: 109 MMHG | BODY MASS INDEX: 23.38 KG/M2

## 2023-03-13 DIAGNOSIS — K21.9 GASTROESOPHAGEAL REFLUX DISEASE, UNSPECIFIED WHETHER ESOPHAGITIS PRESENT: ICD-10-CM

## 2023-03-13 DIAGNOSIS — R04.0 EPISTAXIS: ICD-10-CM

## 2023-03-13 DIAGNOSIS — R04.2 HEMOPTYSIS: Primary | ICD-10-CM

## 2023-03-13 DIAGNOSIS — R49.0 DYSPHONIA: ICD-10-CM

## 2023-03-13 PROCEDURE — 1090F PRES/ABSN URINE INCON ASSESS: CPT | Performed by: STUDENT IN AN ORGANIZED HEALTH CARE EDUCATION/TRAINING PROGRAM

## 2023-03-13 PROCEDURE — G8484 FLU IMMUNIZE NO ADMIN: HCPCS | Performed by: STUDENT IN AN ORGANIZED HEALTH CARE EDUCATION/TRAINING PROGRAM

## 2023-03-13 PROCEDURE — 31575 DIAGNOSTIC LARYNGOSCOPY: CPT | Performed by: STUDENT IN AN ORGANIZED HEALTH CARE EDUCATION/TRAINING PROGRAM

## 2023-03-13 PROCEDURE — 1036F TOBACCO NON-USER: CPT | Performed by: STUDENT IN AN ORGANIZED HEALTH CARE EDUCATION/TRAINING PROGRAM

## 2023-03-13 PROCEDURE — 1123F ACP DISCUSS/DSCN MKR DOCD: CPT | Performed by: STUDENT IN AN ORGANIZED HEALTH CARE EDUCATION/TRAINING PROGRAM

## 2023-03-13 PROCEDURE — G8420 CALC BMI NORM PARAMETERS: HCPCS | Performed by: STUDENT IN AN ORGANIZED HEALTH CARE EDUCATION/TRAINING PROGRAM

## 2023-03-13 PROCEDURE — G8399 PT W/DXA RESULTS DOCUMENT: HCPCS | Performed by: STUDENT IN AN ORGANIZED HEALTH CARE EDUCATION/TRAINING PROGRAM

## 2023-03-13 PROCEDURE — G8427 DOCREV CUR MEDS BY ELIG CLIN: HCPCS | Performed by: STUDENT IN AN ORGANIZED HEALTH CARE EDUCATION/TRAINING PROGRAM

## 2023-03-13 PROCEDURE — 99214 OFFICE O/P EST MOD 30 MIN: CPT | Performed by: STUDENT IN AN ORGANIZED HEALTH CARE EDUCATION/TRAINING PROGRAM

## 2023-03-13 RX ORDER — OMEPRAZOLE 40 MG/1
40 CAPSULE, DELAYED RELEASE ORAL
Qty: 90 CAPSULE | Refills: 1 | Status: SHIPPED | OUTPATIENT
Start: 2023-03-13 | End: 2023-03-13

## 2023-03-13 RX ORDER — OMEPRAZOLE 20 MG/1
20 CAPSULE, DELAYED RELEASE ORAL
Qty: 180 CAPSULE | Refills: 1 | Status: SHIPPED | OUTPATIENT
Start: 2023-03-13

## 2023-03-13 NOTE — PROGRESS NOTES
[address] VISIT      Patient Name: Allison Joseph  Medical Record Number:  [id number]  Primary Care Physician:  Deepa Richard    ChiefComplaint     Chief Complaint   Patient presents with    Follow-up     F/u nasal cancer        History of Present Illness     Allison Joseph is an 81 y.o. female previously seen for history of nasal cavity cancer, acute posterior epistaxis status post left SPA ligation on 3/4/2022, acute pansinusitis. Last seen 1/13/2023. Interval History:   She has been coughing up some bloody tinged mucus over the past few months. She had bloody discharge in her sinonasal saline irrigations when she uses her Neti pot. Feels like she is losing her voice, hoarseness is much worse in the morning. She currently takes Prilosec on a daily basis. She feels like she is unable to sing anymore. Denies shortness of breath.  + Sore throat. Her nose has continuous watery discharge.     Past Medical History     Past Medical History:   Diagnosis Date    Cancer ([address]     sinuses and cheek right    GERD (gastroesophageal reflux disease)     Hyperlipidemia     Hypothyroidism     Nausea & vomiting        Past Surgical History     Past Surgical History:   Procedure Laterality Date    CHOLECYSTECTOMY      HYSTERECTOMY (CERVIX STATUS UNKNOWN)      OTHER SURGICAL HISTORY      chemo/radiation for sinus cancers    SINUS ENDOSCOPY N/A 3/4/2022    NASAL ENDOSCOPY CONTROL OF EPISTAXIS, WITH BIOPSY, SPHENOPALATINE ARTERY LIGATION performed by Jose David Huerta DO at [place] [address] ENDOSCOPY  7-17-13    with biopsy, dilatation, and endoscopic ultrasound    UPPER GASTROINTESTINAL ENDOSCOPY N/A 10/10/2022    EGD DILATION BALLOON performed by Lynne Bueno MD at [facility] N/A 10/10/2022    EGD BIOPSY performed by Lynne Bueno MD at [address] [place] [address] History     Family History   Problem Relation Age of Onset    Cancer Mother     Cancer Father     Cancer Brother        Social History     Social History     Tobacco Use    Smoking status: Never    Smokeless tobacco: Never   Vaping Use    Vaping Use: Never used   Substance Use Topics    Alcohol use: No        Allergies     Allergies   Allergen Reactions    Naprosyn [Naproxen]     Macrobid [Nitrofurantoin Monohydrate Macrocrystals] Rash       Medications     Current Outpatient Medications   Medication Sig Dispense Refill    omeprazole (PRILOSEC) 20 MG delayed release capsule Take 1 capsule by mouth 2 times daily (before meals) 180 capsule 1    Cranberry-Vitamin C-Vitamin E (CRANBERRY PLUS VITAMIN C PO) Take by mouth daily      Omega-3 Fatty Acids (FISH OIL) 1200 MG CAPS Take by mouth daily (with breakfast)      Magnesium 500 MG CAPS Take by mouth daily (with breakfast)      Apoaequorin (PREVAGEN PO) Take by mouth daily (with breakfast)      POTASSIUM PO Take 550 mg by mouth daily (with breakfast)      Multiple Vitamins-Minerals (PRESERVISION AREDS PO) Take by mouth daily (with breakfast)      polycarbophil (FIBERCON) 625 MG tablet Take 1,250 mg by mouth at bedtime      ipratropium (ATROVENT) 0.06 % nasal spray 2 sprays by Each Nostril route 3 times daily as needed for Rhinitis ((runny nose)) 1 each 3    sodium chloride (OCEAN) 0.65 % nasal spray 2 sprays by Nasal route 5 times daily (Patient taking differently: 2 sprays by Nasal route as needed) 1 each 5    pravastatin (PRAVACHOL) 40 MG tablet TAKE 1 TABLET BY MOUTH NIGHTLY. 90 tablet 0    levothyroxine (LEVOTHROID) 75 MCG tablet Take 1 tablet by mouth daily 90 tablet 3    Biotin 1000 MCG TABS Take by mouth daily (with breakfast)      therapeutic multivitamin-minerals (THERAGRAN-M) tablet Take 1 tablet by mouth daily.         No current facility-administered medications for this visit.       Review of Systems     REVIEW OF SYSTEM: See HPI above    PhysicalExam  Vitals:    03/13/23 1032   BP: 109/77   Site: Left Upper Arm   Position: Sitting   Cuff Size: Medium Adult   Pulse: 63   Temp: 97.4 °F (36.3 °C)   TempSrc: Temporal   SpO2: 96%   Height: 5' 3\" (1.6 m)       PHYSICAL EXAM  /77 (Site: Left Upper Arm, Position: Sitting, Cuff Size: Medium Adult)   Pulse 63   Temp 97.4 °F (36.3 °C) (Temporal)   Ht 5' 3\" (1.6 m)   SpO2 96%   BMI 23.38 kg/m²     GENERAL: No acute distress, alert and oriented. EYES: EOMI, Anti-icteric. NOSE: On anterior rhinoscopy there is no epistaxis, nasal mucosa moist and normal appearing, no purulent drainage. EARS: Normal external appearance; on portable otomicroscopy:     -Ad: External auditory canal without stenosis, tympanic membrane clear, no middle ear effusions or retractions     -As: External auditory canal without stenosis, tympanic membrane clear, no middle ear effusions or retractions  FACE: HB 1/6 bilaterally, symmetric appearing, sensation equal bilaterally  ORAL CAVITY: No masses or lesions visualized or palpated, uvula is midline, moist mucous membranes, no oropharyngeal masses or oropharyngeal obstruction  NECK: Normal range of motion, no thyromegaly, trachea is midline, no palpable lymphadenopathy or neck masses, no crepitus  CHEST: Normal respiratory effort, breathing comfortably, no retractions  SKIN: No rashes, normal appearing skin, no evidence of skin lesions/tumors  NEURO: Cranial Nerves 2, 3, 4, 5, 6, 7, 11, 12 grossly intact bilaterally     I have performed a head and neck physical exam personally or was physically present during the key or critical portions of the service.     Procedure     Procedure: Flexible Laryngoscopy    Pre-op: Hemoptysis, dysphonia  Post-op: Same  Procedure : Flexible Nasopharyngolaryngoscopy  Surgeon: Dr. Maria Dolores Wright DO  Anesthesia: Afrin with 2% lidocaine  Estimated Blood Loss: None    Description of Procedure:  After obtaining consent, the patient was placed in the examination chair in the upright position. Decongestant and topical anesthetic was sprayed in the bilateral nares and after allowing adequate time for hemostatic effect, the flexible 4 mm laryngoscope was passed via the bilateral nasal passages. Nasal Septum: No acute septal deviation, small posterior nasal septal perforation with clean margins  Nasal Findings: Postoperative changes noted bilaterally. No active bleeding, no exophytic mass lesions. No purulence. Nasopharynx: Clear, no masses or inflammation  Oropharynx: No exophytic or ulcerative lesions, mucosa appears within normal limits  Base of Tongue: Lingual tonsils within normal limits, vallecula without effacement  Epiglottis: Upright, in normal anatomical position  True Vocal Cord: Anatomically normal, no masses or inflammation, normal abduction and adduction upon inspiration and phonation  False Vocal Cord: normal appearing without masses  Hypopharynx Mucosa: No masses or inflammation of the piriform sinuses or postcricoid area  Arytenoids: Normal mucosa, no dislocation appreciated     * Patient tolerated the procedure well with no complications   * Patient was instructed not to eat for 30 minutes following procedure. * Patient was instructed that they may notice minor bleeding. Assessment and Plan     1. Hemoptysis  - CT CHEST W CONTRAST; Future    2. Dysphonia  -Flexible fiberoptic laryngoscopy without evidence of glottic or supraglottic lesions. 3. Epistaxis  -No active epistaxis noted on nasal endoscopy  -Continue moisturization with daily sinonasal saline irrigation    4. Gastroesophageal reflux disease, unspecified whether esophagitis present  -May be related sore throat and hoarseness. Increase omeprazole 20 mg to twice daily dosing. Follow-Up     Return in about 1 month (around 4/13/2023) for after imaging.       Mckenzie Bertrand   Department of Otolaryngology/Head and Neck Surgery  3/13/23    Medical Decision Making: The following items were considered in medical decision making:  Independent review of images  Review / order clinical lab tests  Review / order radiology tests  Decision to obtain old records      This note was generated completely or in part utilizing Dragon dictation speech recognition software. Occasionally, words are mistranscribed and despite editing, the text may contain inaccuracies due to incorrect word recognition. If further clarification is needed please contact the office at [phone].

## 2023-04-18 ENCOUNTER — OFFICE VISIT (OUTPATIENT)
Dept: ENT CLINIC | Age: 83
End: 2023-04-18
Payer: MEDICARE

## 2023-04-18 VITALS
OXYGEN SATURATION: 97 % | BODY MASS INDEX: 23.38 KG/M2 | TEMPERATURE: 97.3 F | SYSTOLIC BLOOD PRESSURE: 104 MMHG | HEIGHT: 63 IN | DIASTOLIC BLOOD PRESSURE: 70 MMHG | HEART RATE: 70 BPM

## 2023-04-18 DIAGNOSIS — J02.9 ACUTE PHARYNGITIS, UNSPECIFIED ETIOLOGY: Primary | ICD-10-CM

## 2023-04-18 DIAGNOSIS — J30.0 VASOMOTOR RHINITIS: ICD-10-CM

## 2023-04-18 DIAGNOSIS — R04.0 EPISTAXIS: ICD-10-CM

## 2023-04-18 DIAGNOSIS — J34.89 NASAL CRUSTING: ICD-10-CM

## 2023-04-18 PROCEDURE — G8399 PT W/DXA RESULTS DOCUMENT: HCPCS | Performed by: STUDENT IN AN ORGANIZED HEALTH CARE EDUCATION/TRAINING PROGRAM

## 2023-04-18 PROCEDURE — 1036F TOBACCO NON-USER: CPT | Performed by: STUDENT IN AN ORGANIZED HEALTH CARE EDUCATION/TRAINING PROGRAM

## 2023-04-18 PROCEDURE — 1123F ACP DISCUSS/DSCN MKR DOCD: CPT | Performed by: STUDENT IN AN ORGANIZED HEALTH CARE EDUCATION/TRAINING PROGRAM

## 2023-04-18 PROCEDURE — 99214 OFFICE O/P EST MOD 30 MIN: CPT | Performed by: STUDENT IN AN ORGANIZED HEALTH CARE EDUCATION/TRAINING PROGRAM

## 2023-04-18 PROCEDURE — 1090F PRES/ABSN URINE INCON ASSESS: CPT | Performed by: STUDENT IN AN ORGANIZED HEALTH CARE EDUCATION/TRAINING PROGRAM

## 2023-04-18 PROCEDURE — G8420 CALC BMI NORM PARAMETERS: HCPCS | Performed by: STUDENT IN AN ORGANIZED HEALTH CARE EDUCATION/TRAINING PROGRAM

## 2023-04-18 PROCEDURE — G8427 DOCREV CUR MEDS BY ELIG CLIN: HCPCS | Performed by: STUDENT IN AN ORGANIZED HEALTH CARE EDUCATION/TRAINING PROGRAM

## 2023-04-18 RX ORDER — AMOXICILLIN 250 MG/1
250 CAPSULE ORAL 3 TIMES DAILY
COMMUNITY
End: 2023-04-18

## 2023-04-18 RX ORDER — AMOXICILLIN 500 MG/1
500 CAPSULE ORAL 2 TIMES DAILY
Qty: 10 CAPSULE | Refills: 0 | Status: SHIPPED | OUTPATIENT
Start: 2023-04-18 | End: 2023-04-23

## 2023-04-18 NOTE — PROGRESS NOTES
[address] VISIT      Patient Name: Kade Jackson Record Number:  [id number]  Primary Care Physician:  [id number] Richard    ChiefComplaint     Chief Complaint   Patient presents with    Other     Still has a cough, feels like something in throat        History of Present Illness       Interval History:   Recently she had a sore throat for which she was placed on Amoxil which improved her symptoms but has not completely resolved. She just finished this prescription but she thinks a few extra days of Amoxil will help with her symptoms. Overall her nose feels good. Continues to have constant nasal drainage, watery secretions, from her nose, not significantly improved with Atrovent nasal spray although she uses this. Performing sinonasal saline irrigations daily. Often times when blowing the nose will get dried bloody secretions. She does have a cough at night. No other recent antibiotic use. Denies dysphagia or voice changes.       Past Medical History     Past Medical History:   Diagnosis Date    Cancer ([address]     sinuses and cheek right    GERD (gastroesophageal reflux disease)     Hyperlipidemia     Hypothyroidism     Nausea & vomiting        Past Surgical History     Past Surgical History:   Procedure Laterality Date    CHOLECYSTECTOMY      HYSTERECTOMY (CERVIX STATUS UNKNOWN)      OTHER SURGICAL HISTORY      chemo/radiation for sinus cancers    SINUS ENDOSCOPY N/A 3/4/2022    NASAL ENDOSCOPY CONTROL OF EPISTAXIS, WITH BIOPSY, SPHENOPALATINE ARTERY LIGATION performed by Aby Aquino DO at [place] [address] ENDOSCOPY  7-17-13    with biopsy, dilatation, and endoscopic ultrasound    UPPER GASTROINTESTINAL ENDOSCOPY N/A 10/10/2022    EGD DILATION BALLOON performed by Karlene Diaz MD at [address] [place] [address] N/A 10/10/2022    EGD BIOPSY performed by Bennie Dickey

## 2023-10-11 ENCOUNTER — TELEPHONE (OUTPATIENT)
Dept: ENT CLINIC | Age: 83
End: 2023-10-11

## 2023-10-11 NOTE — TELEPHONE ENCOUNTER
Pt.stated she was told by Dr. Maulik Burgos to call the office and to be seen right away if she has any congestion. She said she is congested and can't hardly breathe I inform the patient I will send a message to the doctor it will be up to him.

## 2023-10-12 NOTE — TELEPHONE ENCOUNTER
LMOVM informing patient that the 1 o'clock overbook has been taken, as of right now at 11:!5 there is a 1:45 she can be offered if she calls back and the appointment is still there.      Unable to reach patient after multiple attempts

## 2023-10-26 ENCOUNTER — OFFICE VISIT (OUTPATIENT)
Dept: ENT CLINIC | Age: 83
End: 2023-10-26
Payer: MEDICARE

## 2023-10-26 VITALS
OXYGEN SATURATION: 95 % | DIASTOLIC BLOOD PRESSURE: 66 MMHG | BODY MASS INDEX: 24.27 KG/M2 | SYSTOLIC BLOOD PRESSURE: 102 MMHG | HEIGHT: 63 IN | HEART RATE: 68 BPM | WEIGHT: 137 LBS | TEMPERATURE: 97.3 F

## 2023-10-26 DIAGNOSIS — R04.0 EPISTAXIS: ICD-10-CM

## 2023-10-26 DIAGNOSIS — J01.90 ACUTE SINUSITIS, RECURRENCE NOT SPECIFIED, UNSPECIFIED LOCATION: Primary | ICD-10-CM

## 2023-10-26 PROCEDURE — G8484 FLU IMMUNIZE NO ADMIN: HCPCS | Performed by: STUDENT IN AN ORGANIZED HEALTH CARE EDUCATION/TRAINING PROGRAM

## 2023-10-26 PROCEDURE — G8420 CALC BMI NORM PARAMETERS: HCPCS | Performed by: STUDENT IN AN ORGANIZED HEALTH CARE EDUCATION/TRAINING PROGRAM

## 2023-10-26 PROCEDURE — 99214 OFFICE O/P EST MOD 30 MIN: CPT | Performed by: STUDENT IN AN ORGANIZED HEALTH CARE EDUCATION/TRAINING PROGRAM

## 2023-10-26 PROCEDURE — G8427 DOCREV CUR MEDS BY ELIG CLIN: HCPCS | Performed by: STUDENT IN AN ORGANIZED HEALTH CARE EDUCATION/TRAINING PROGRAM

## 2023-10-26 PROCEDURE — 1090F PRES/ABSN URINE INCON ASSESS: CPT | Performed by: STUDENT IN AN ORGANIZED HEALTH CARE EDUCATION/TRAINING PROGRAM

## 2023-10-26 PROCEDURE — 1123F ACP DISCUSS/DSCN MKR DOCD: CPT | Performed by: STUDENT IN AN ORGANIZED HEALTH CARE EDUCATION/TRAINING PROGRAM

## 2023-10-26 PROCEDURE — 1036F TOBACCO NON-USER: CPT | Performed by: STUDENT IN AN ORGANIZED HEALTH CARE EDUCATION/TRAINING PROGRAM

## 2023-10-26 PROCEDURE — G8399 PT W/DXA RESULTS DOCUMENT: HCPCS | Performed by: STUDENT IN AN ORGANIZED HEALTH CARE EDUCATION/TRAINING PROGRAM

## 2023-10-26 RX ORDER — AMOXICILLIN AND CLAVULANATE POTASSIUM 875; 125 MG/1; MG/1
1 TABLET, FILM COATED ORAL 2 TIMES DAILY
Qty: 20 TABLET | Refills: 0 | Status: SHIPPED | OUTPATIENT
Start: 2023-10-26 | End: 2023-11-05

## 2023-10-26 NOTE — PROGRESS NOTES
[facility] [address] VISIT      Patient Name: Júnior sofatutor Record Number:  [id number]  Primary Care Physician:  Jan Hernandez    ChiefComplaint     Chief Complaint   Patient presents with    Other     Nose dripping, nasal drainage, has some blood in mucus, hard to breath through the nose at  night        History of Present Illness     Interval History:   She has felt sick for the last 3 to 4 weeks. With chronic nasal congestion that is worse over the past 3 to 4 weeks. Also with fatigue. She states that overall she does not feel well. She has had blood-tinged mucopurulent secretion from her bilateral nasal passages. . Midface pain and pressure. No recent ATBX.      Past Medical History     Past Medical History:   Diagnosis Date    Cancer ([address])     sinuses and cheek right    GERD (gastroesophageal reflux disease)     Hyperlipidemia     Hypothyroidism     Nausea & vomiting        Past Surgical History     Past Surgical History:   Procedure Laterality Date    CHOLECYSTECTOMY      HYSTERECTOMY (CERVIX STATUS UNKNOWN)      OTHER SURGICAL HISTORY      chemo/radiation for sinus cancers    SINUS ENDOSCOPY N/A 3/4/2022    NASAL ENDOSCOPY CONTROL OF EPISTAXIS, WITH BIOPSY, SPHENOPALATINE ARTERY LIGATION performed by Maddie Lindsay DO at [address] [place] [address],   7-17-13    with biopsy, dilatation, and endoscopic ultrasound    UPPER GASTROINTESTINAL ENDOSCOPY N/A 10/10/2022    EGD DILATION BALLOON performed by Ang Fernando MD at [facility] N/A 10/10/2022    EGD BIOPSY performed by Ang Fernando MD at [facility]       Family History     Family History   Problem Relation Age of Onset    Cancer Mother     Cancer Father     Cancer Brother        Social History     Social History     Tobacco Use    Smoking status: Never    Smokeless tobacco: Never   Vaping

## 2024-02-19 ENCOUNTER — HOSPITAL ENCOUNTER (EMERGENCY)
Age: 84
Discharge: HOME | End: 2024-02-19
Payer: MEDICARE

## 2024-02-19 VITALS
OXYGEN SATURATION: 95 % | BODY MASS INDEX: 24.22 KG/M2 | DIASTOLIC BLOOD PRESSURE: 81 MMHG | RESPIRATION RATE: 18 BRPM | TEMPERATURE: 98.2 F | HEIGHT: 63 IN | HEART RATE: 80 BPM | WEIGHT: 136.69 LBS | SYSTOLIC BLOOD PRESSURE: 146 MMHG

## 2024-02-19 DIAGNOSIS — R04.0 EPISTAXIS: Primary | ICD-10-CM

## 2024-02-19 PROCEDURE — 99282 EMERGENCY DEPT VISIT SF MDM: CPT

## 2024-02-19 ASSESSMENT — ENCOUNTER SYMPTOMS
RHINORRHEA: 0
DIARRHEA: 0
VOMITING: 0
COUGH: 0
NAUSEA: 0
WHEEZING: 0
ABDOMINAL PAIN: 0
SHORTNESS OF BREATH: 0

## 2024-02-19 ASSESSMENT — LIFESTYLE VARIABLES
HOW OFTEN DO YOU HAVE A DRINK CONTAINING ALCOHOL: NEVER
HOW MANY STANDARD DRINKS CONTAINING ALCOHOL DO YOU HAVE ON A TYPICAL DAY: PATIENT DOES NOT DRINK

## 2024-02-19 ASSESSMENT — PAIN - FUNCTIONAL ASSESSMENT: PAIN_FUNCTIONAL_ASSESSMENT: NONE - DENIES PAIN

## 2024-02-19 NOTE — ED PROVIDER NOTES
[facility] EMERGENCY DEPARTMENT  EMERGENCY DEPARTMENT ENCOUNTER        Pt Name: Britta Zepeda  MRN: [id number]  Birthdate 1940  Date of evaluation: 2/19/2024  Provider: Jacqueline Mills PA-C  PCP: Jose Frost  Note Started: 4:13 PM EST 2/19/24      MARLEN. I have evaluated this patient.        CHIEF COMPLAINT       Chief Complaint   Patient presents with    Epistaxis     Pt w c/o epistaxis from 1-2 pm. Pt w cancer. Pt states epistaxis x3 times last week       HISTORY OF PRESENT ILLNESS: 1 or more Elements     History From: patient   Limitations to history : None    Britta Zepeda is a 84 y.o. female who presents for evaluation of epistaxis that started around 1 or 2:00 this afternoon.  States that she has had 3 nosebleeds over the past couple of weeks but has been able to get them controlled up until this afternoon.  States that her  became worried and called her daughter who made her come to the ER.  She is not anticoagulated but is concerned as she does have a history of sinus cancer.  She sees Dr. Monique, ENT.  She called his office and they were not able to get her in to be seen today.  She denies dizziness or lightheadedness.  No nausea or vomiting.  No other easy bruising or bleeding.  No injury or fall.  She has no other complaints or concerns at this time.    Nursing Notes were all reviewed and agreed with or any disagreements were addressed in the HPI.    REVIEW OF SYSTEMS :      Review of Systems   Constitutional:  Negative for appetite change, chills and fever.   HENT:  Positive for nosebleeds. Negative for congestion and rhinorrhea.    Respiratory:  Negative for cough, shortness of breath and wheezing.    Cardiovascular:  Negative for chest pain.   Gastrointestinal:  Negative for abdominal pain, diarrhea, nausea and vomiting.   Genitourinary:  Negative for difficulty urinating, dysuria and hematuria.   Musculoskeletal:  Negative for neck pain and neck stiffness.   Skin:

## 2024-07-17 DIAGNOSIS — E03.9 HYPOTHYROIDISM, UNSPECIFIED: ICD-10-CM

## 2024-08-22 RX ORDER — LEVOTHYROXINE SODIUM 88 UG/1
88 TABLET ORAL DAILY
Qty: 90 TABLET | OUTPATIENT
Start: 2024-08-22

## (undated) DEVICE — CORD,CAUTERY,BIPOLAR,STERILE: Brand: MEDLINE

## (undated) DEVICE — Device: Brand: HEMOPORE

## (undated) DEVICE — MOUTHPIECE ENDOSCP L CTRL OPN AND SIDE PORTS DISP

## (undated) DEVICE — BLADE 1882916 RAD60 3PK SINUS 2.9MM: Brand: RAD®

## (undated) DEVICE — TUBING SUCT 8FR MAL ALUM SHFT FN CAP VENT UNIV CONN W/ OBT

## (undated) DEVICE — ENDOSCOPIC KIT 6X3/16 FT COLON W/ 1.1 OZ 2 GWN W/O BRSH

## (undated) DEVICE — SPONGE ABSRB L2XW2IN TYP VII GZ 12 PLY XR DTECT LTX FREE WVN

## (undated) DEVICE — DILATOR ENDOSCP L180CM DIA6FR BLLN L8CM DIA54-60FR

## (undated) DEVICE — KIT,ANTI FOG,W/SPONGE & FLUID,SOFT PACK: Brand: MEDLINE

## (undated) DEVICE — STERILE POLYISOPRENE POWDER-FREE SURGICAL GLOVES: Brand: PROTEXIS

## (undated) DEVICE — AIR/WATER CLEANING ADAPTER FOR OLYMPUS® GI ENDOSCOPE: Brand: BULLDOG®

## (undated) DEVICE — MEDICINE CUP, GRADUATED, STER: Brand: MEDLINE

## (undated) DEVICE — YANKAUER SUCTION INSTRUMENT NO CONTROL VENT, BULB TIP, CLEAR: Brand: YANKAUER

## (undated) DEVICE — GOWN,REINFORCED,POLY,SIRUS,XLNG/XXLG: Brand: MEDLINE

## (undated) DEVICE — PACK PROCEDURE SURG EXTREMITY MFFOP CUST

## (undated) DEVICE — GOWN,AURORA,NONREINF,RAGLAN,XXL,STERILE: Brand: MEDLINE

## (undated) DEVICE — STAPLER SKIN H3.9MM WIRE DIA0.58MM CRWN 6.9MM 35 STPL ROT

## (undated) DEVICE — BW-412T DISP COMBO CLEANING BRUSH: Brand: SINGLE USE COMBINATION CLEANING BRUSH

## (undated) DEVICE — VALVE SUCTION AIR H2O SET ORCA POD + DISP

## (undated) DEVICE — FORCEPS BX L240CM WRK CHN 2.8MM STD CAP W/ NDL MIC MESH

## (undated) DEVICE — CODMAN® SURGICAL PATTIES 1/2" X 3" (1.27CM X 7.62CM): Brand: CODMAN®

## (undated) DEVICE — SOLUTION IRRIG 500ML 0.9% SOD CHL USP POUR PLAS BTL

## (undated) DEVICE — DRAPE,EENT,SPLIT,STERILE: Brand: MEDLINE

## (undated) DEVICE — HYPODERMIC SAFETY NEEDLE: Brand: MAGELLAN

## (undated) DEVICE — COAGULATOR SUCT 10FR L6IN HND FT SWCH VALLEYLAB

## (undated) DEVICE — TOWEL,OR,DSP,ST,BLUE,STD,4/PK,20PK/CS: Brand: MEDLINE

## (undated) DEVICE — TUBING 1895522 5PK STRAIGHTSHOT TO XPS: Brand: STRAIGHTSHOT®

## (undated) DEVICE — GLOVE SURG SZ 85 L12IN FNGR ORTHO 126MIL CRM LTX FREE

## (undated) DEVICE — SYRINGE INFL 60ML DISP ALLIANCE II

## (undated) DEVICE — SYRINGE MED 10ML TRNSLUC BRL PLUNG BLK MRK POLYPR CTRL

## (undated) DEVICE — SOLUTION IV IRRIG WATER 500ML POUR BRL ST 2F7113